# Patient Record
Sex: FEMALE | Race: BLACK OR AFRICAN AMERICAN | NOT HISPANIC OR LATINO | Employment: STUDENT | URBAN - METROPOLITAN AREA
[De-identification: names, ages, dates, MRNs, and addresses within clinical notes are randomized per-mention and may not be internally consistent; named-entity substitution may affect disease eponyms.]

---

## 2018-02-09 ENCOUNTER — NUTRITION (OUTPATIENT)
Dept: NUTRITION | Facility: CLINIC | Age: 38
End: 2018-02-09
Payer: COMMERCIAL

## 2018-02-09 DIAGNOSIS — E66.9 OBESITY, UNSPECIFIED CLASSIFICATION, UNSPECIFIED OBESITY TYPE, UNSPECIFIED WHETHER SERIOUS COMORBIDITY PRESENT: ICD-10-CM

## 2018-02-09 DIAGNOSIS — R73.09 HIGH GLUCOSE: Primary | ICD-10-CM

## 2018-02-09 DIAGNOSIS — E66.9 OBESITY (BMI 30.0-34.9): Primary | ICD-10-CM

## 2018-02-09 PROCEDURE — 97802 MEDICAL NUTRITION INDIV IN: CPT | Mod: S$GLB,,, | Performed by: NUTRITIONIST

## 2018-02-09 NOTE — PROGRESS NOTES
"Nutrition Assessment for Medical Nutrition Therapy    Referring professional: Kenneth    Reason for MNT visit: Pt in for education and nutrition counseling regarding blood sugar control and meal plan to help aid in weight loss    Past Medical History:  Active Ambulatory Problems     Diagnosis Date Noted    No Active Ambulatory Problems     Resolved Ambulatory Problems     Diagnosis Date Noted    No Resolved Ambulatory Problems     No Additional Past Medical History   Client was very difficult to understand (client has a very thick accent and doesn't speak English very well)    Pertinent Labs: n/a    Medications: birth control (she isn't sure brand)    Wt: 209.7   Height: 5'5"  BMI: 34.89    Energy Requirements:  Calories: 1500  Protein: 104-167 grams  Carbohydrates:  grams  Focus on Heart Healthy Fats  Fluid: 102 oz per day + sweat loss    Breakfast: 1 serving of carb = 15-20 grams + at least 10 grams lean protein/heart healthy fat   1-2 slices 100% whole wheat bread + 2 eggs   ¾ cup cereal (see notes for brands) + Fairlife milk   [place]: 2 Slices 100% whole wheat bread + 3 oz turkey/ham + slice of cheese    Snack: 1 serving of carb = 15-20 grams + at least 10 grams lean protein/heart healthy fat   Needed if going >3-4 hours between meals (See below snack for options)    Lunch: 5 oz lean protein + non-starchy veggies + 1 serving of carb = 15-20 grams   Think ½ cup cooked starch (Ex: brown rice) + 5 oz lean protein (Ex: chicken) + side of non-starchy veggies   (Look at the Plate Method on the sheet of paper I gave to you)    Snack: 1 serving of carb = 15-20 grams + at least 10 grams lean protein/heart healthy fat   10 Beanitos + ¼ cup shredded cheese melted on top   10 Beanitos + 1/3 cup guacamole   Piece of fruit + 1 Tbsp PB   1 slice of 100% whole wheat bread + 1 Tbsp PB   Any breakfast can be a snack   No sugar added hot chocolate packet + piece of cheese on the side    Dinner: NO " carbs   Focus on 5 oz lean protein (palm size/thickness) + unlimited non-starchy veggies  o Cauliflower rice, zoodles, kabobs, stir arana, etc.     Optional Snack: Anything UP  calories   1 fun size piece of Halloween candy   1/3 pint Halo Top high protein ice cream   Enlightened ice cream bar   1 packet of no sugar added hot chocolate          Additional Notes:  Incorporate a source of lean protein, fiber, and heart healthy fat with each meal.   - These three nutrients take the longest to digest, so we feel full longer    Restaurant TipsPick 2 out of the 5:  1. Bread and butter/chips and salsa  2. Appetizer  3. Entrée  4. Dessert  5. Alcohol  Eating out in [place]:  1. www.eatBranding Brand  2. [facility] Eat Fit elise (Free elise for ENDOGENXs)  a. List of all Eat Fit restaurants  b. See what dishes are Eat Fit at each restaurant  c. See the nutrition facts for each Eat Fit dish  d. Provides >200 Eat Fit recipes  3. Fast Food Lite Guide  Salad Fats: Pick 2, each being 2 Tbsp:  1. Dressing  2. Cheese  3. Nuts  4. Mathias  5. Avocado (1/4 okay)  6. Guacamole  7. Sour cream  8. Egg (1 okay)    [place] products: Look for products that have <7 grams sugar, and at least 7 grams or more protein  -Choose 100% whole wheat/whole grain products (Essentially, wheat bread and wheat flour mean white bread/flour)  -3 things that cause inflammation to our body:   White/refined carbohydrates   Sugar   Alcohol  -Cheat meals: 2x per month  -Fairlife milk  -Cereals: Look for ones that have <7 grams sugar and at least 7 or more grams protein:   1. Special K PROTEIN   2. Nutritious Livings Hi-Lo   3. Kashi: GO LEAN  -Even 100% fruit juice contains the same amount of sugar as a soft drink.     Nutrition History       Food allergies  intolerances: NKFA    Dining out: Infrequent, 1-2 times per week    Smoking/alcohol: nonsmoker; drinks a glass of wine or rum punch once a month at the most (very rare)    Beverages:   Coffee:  1-2 cups per day with Stevia  Water: 2-3, 32 oz glasses per day  Fruit juice: 8 oz few times a week  Hot chocolate:1 cup every day    Meal preparation/shopping: self, spouse      Recommendations/Interventions:  1. Aim for 7-9 hours sleep  2. Exercise 60 minutes most days  3. Eat breakfast within 1-2 hours of waking up  4. Try not to skip any meals or snacks, not going more than 3-4 hours without eating.   5. At each meal and snack, try to include a source of fiber + lean protein + healthy fat.     Written Materials Provided  These resources are intended to assist the patient in making it easier to choose recommended options when eating out to identify better-for-you brands at the grocery store:     Meal Planning Guide with recommendations discussed along with portion sizes and a customized meal plan.    Eat Fit elise card as a reminder to download the elise to ones smart phone which provides: current Eat Fit partners, approved menu options, food labels for carb counting, & recipes.    Fast Food Lite Guide   brotips Grocery Product list    RD contact information      Comprehension: poor    Motivation to change: moderate      Follow-up: 3 weeks    Counseling time: 1 Hour 30 Minutes

## 2018-03-05 ENCOUNTER — NUTRITION (OUTPATIENT)
Dept: NUTRITION | Facility: CLINIC | Age: 38
End: 2018-03-05
Payer: COMMERCIAL

## 2018-03-05 DIAGNOSIS — Z71.3 NUTRITIONAL COUNSELING: Primary | ICD-10-CM

## 2018-03-05 PROCEDURE — 97803 MED NUTRITION INDIV SUBSEQ: CPT | Mod: S$GLB,,, | Performed by: NUTRITIONIST

## 2018-03-05 NOTE — PROGRESS NOTES
Reason for MNT visit: nutritional counseling to lose body fat.    Her clothes are fitting a little looser, but not a lot. We last met on 2/9/2018. Her weight stayed the same (209 pounds). She has been doing some carbohydrates at night. Snacks: slice of whole wheat bread or apple + Pb. She liked the Special K protein cereal (3/4 cup). She hasn't been drinking hot chocolate in the morning. Breakfast: smoothie: Total lean protein powder + celery, pineapple and spinach.    Last week she did 1 day of exercise. The week before she exercised 5 days per week. The goal was at least 4 days per week for 1 hour. She has been walking on the treadmill. She did talk with a  who recommended doing some weights.     She got rid of the sweets. I provided more snack options to give her a variety.     She doesn't speak English very well, so I recommended her to write down everything to a suzy (portions of all meals and snacks + timing) and to email me a few days worth to see if I can find any hidden calories      Comprehension: poor    Motivation to change: moderate    Follow-up: 3 weeks    Counseling time: 45 Minutes

## 2018-03-26 ENCOUNTER — NUTRITION (OUTPATIENT)
Dept: NUTRITION | Facility: CLINIC | Age: 38
End: 2018-03-26
Payer: COMMERCIAL

## 2018-03-26 DIAGNOSIS — Z71.3 NUTRITIONAL COUNSELING: Primary | ICD-10-CM

## 2018-03-26 PROCEDURE — 97803 MED NUTRITION INDIV SUBSEQ: CPT | Mod: S$GLB,,, | Performed by: NUTRITIONIST

## 2018-03-27 NOTE — PROGRESS NOTES
Reason for MNT visit: overweight    She lost 7 pounds since we last met 2 weeks ago. Her weight is 203#, and she is feeling great. She reviewed her meal plan and stuck with he recommended portions for all nutrients and is doing well with no carbs at night.       Recommendations: Continue following recommended meal plan    Comprehension: good     Motivation to change: moderate    Follow-up: 3 weeks    Counseling time: 45 Minutes

## 2018-04-16 ENCOUNTER — NUTRITION (OUTPATIENT)
Dept: NUTRITION | Facility: CLINIC | Age: 38
End: 2018-04-16
Payer: COMMERCIAL

## 2018-04-16 DIAGNOSIS — Z71.3 NUTRITIONAL COUNSELING: Primary | ICD-10-CM

## 2018-04-16 PROCEDURE — 97803 MED NUTRITION INDIV SUBSEQ: CPT | Mod: S$GLB,,, | Performed by: NUTRITIONIST

## 2018-04-16 NOTE — PROGRESS NOTES
Reason for MNT visit: goal of weight loss    Weight: 201.6 pounds  She is down 1.4 pounds from our last visit 3 weeks.   She is still exercising 5-6 days per week.   She feels constipated, but is drinking plenty of water and eating the appropriate fiber (she has had issues with constipation since she was a child).   She is going to [place] for 1.5 months. The focus on this is weight control, but she can lose ewight by keeping portion control in check + exercise.       Recommendations: Continue following recommended meal plan. Also recommended adding 2 Tbsp psyllium to her morning smoothie.     Comprehension: fair     Motivation to change: moderate    Follow-up: PRN    Counseling time: 45 Minutes

## 2019-07-07 ENCOUNTER — HOSPITAL ENCOUNTER (EMERGENCY)
Facility: HOSPITAL | Age: 39
Discharge: HOME | End: 2019-07-07
Attending: EMERGENCY MEDICINE
Payer: COMMERCIAL

## 2019-07-07 VITALS
WEIGHT: 195 LBS | BODY MASS INDEX: 32.49 KG/M2 | RESPIRATION RATE: 14 BRPM | TEMPERATURE: 98 F | DIASTOLIC BLOOD PRESSURE: 82 MMHG | OXYGEN SATURATION: 99 % | HEIGHT: 65 IN | HEART RATE: 74 BPM | SYSTOLIC BLOOD PRESSURE: 122 MMHG

## 2019-07-07 DIAGNOSIS — R20.2 PARESTHESIAS: ICD-10-CM

## 2019-07-07 DIAGNOSIS — R53.83 FATIGUE, UNSPECIFIED TYPE: Primary | ICD-10-CM

## 2019-07-07 LAB
ANION GAP SERPL CALC-SCNC: 8 MMOL/L (ref 8–16)
B-HCG UR QL: NEGATIVE
BACTERIA #/AREA URNS HPF: NORMAL /HPF
BASOPHILS # BLD AUTO: 0.02 K/UL (ref 0–0.2)
BASOPHILS NFR BLD: 0.2 % (ref 0–1.9)
BILIRUB UR QL STRIP: NEGATIVE
BUN SERPL-MCNC: 12 MG/DL (ref 6–20)
CALCIUM SERPL-MCNC: 9.7 MG/DL (ref 8.7–10.5)
CHLORIDE SERPL-SCNC: 104 MMOL/L (ref 95–110)
CLARITY UR: CLEAR
CO2 SERPL-SCNC: 26 MMOL/L (ref 23–29)
COLOR UR: YELLOW
CREAT SERPL-MCNC: 0.9 MG/DL (ref 0.5–1.4)
CTP QC/QA: YES
DIFFERENTIAL METHOD: ABNORMAL
EOSINOPHIL # BLD AUTO: 0.3 K/UL (ref 0–0.5)
EOSINOPHIL NFR BLD: 3.3 % (ref 0–8)
ERYTHROCYTE [DISTWIDTH] IN BLOOD BY AUTOMATED COUNT: 13.3 % (ref 11.5–14.5)
EST. GFR  (AFRICAN AMERICAN): >60 ML/MIN/1.73 M^2
EST. GFR  (NON AFRICAN AMERICAN): >60 ML/MIN/1.73 M^2
GLUCOSE SERPL-MCNC: 84 MG/DL (ref 70–110)
GLUCOSE UR QL STRIP: NEGATIVE
HCT VFR BLD AUTO: 35.4 % (ref 37–48.5)
HGB BLD-MCNC: 10.9 G/DL (ref 12–16)
HGB UR QL STRIP: ABNORMAL
KETONES UR QL STRIP: NEGATIVE
LEUKOCYTE ESTERASE UR QL STRIP: NEGATIVE
LYMPHOCYTES # BLD AUTO: 2.5 K/UL (ref 1–4.8)
LYMPHOCYTES NFR BLD: 30.7 % (ref 18–48)
MCH RBC QN AUTO: 29.2 PG (ref 27–31)
MCHC RBC AUTO-ENTMCNC: 30.8 G/DL (ref 32–36)
MCV RBC AUTO: 95 FL (ref 82–98)
MICROSCOPIC COMMENT: NORMAL
MONOCYTES # BLD AUTO: 0.5 K/UL (ref 0.3–1)
MONOCYTES NFR BLD: 5.8 % (ref 4–15)
NEUTROPHILS # BLD AUTO: 4.8 K/UL (ref 1.8–7.7)
NEUTROPHILS NFR BLD: 60 % (ref 38–73)
NITRITE UR QL STRIP: NEGATIVE
PH UR STRIP: 8 [PH] (ref 5–8)
PLATELET # BLD AUTO: 391 K/UL (ref 150–350)
PMV BLD AUTO: 9.8 FL (ref 9.2–12.9)
POTASSIUM SERPL-SCNC: 4.1 MMOL/L (ref 3.5–5.1)
PROT UR QL STRIP: NEGATIVE
RBC # BLD AUTO: 3.73 M/UL (ref 4–5.4)
RBC #/AREA URNS HPF: 1 /HPF (ref 0–4)
SODIUM SERPL-SCNC: 138 MMOL/L (ref 136–145)
SP GR UR STRIP: 1.01 (ref 1–1.03)
TSH SERPL DL<=0.005 MIU/L-ACNC: 2.28 UIU/ML (ref 0.4–4)
URN SPEC COLLECT METH UR: ABNORMAL
UROBILINOGEN UR STRIP-ACNC: NEGATIVE EU/DL
WBC # BLD AUTO: 8.1 K/UL (ref 3.9–12.7)
WBC #/AREA URNS HPF: 0 /HPF (ref 0–5)

## 2019-07-07 PROCEDURE — 81000 URINALYSIS NONAUTO W/SCOPE: CPT

## 2019-07-07 PROCEDURE — 85025 COMPLETE CBC W/AUTO DIFF WBC: CPT

## 2019-07-07 PROCEDURE — 84443 ASSAY THYROID STIM HORMONE: CPT

## 2019-07-07 PROCEDURE — 81025 URINE PREGNANCY TEST: CPT | Performed by: NURSE PRACTITIONER

## 2019-07-07 PROCEDURE — 93005 ELECTROCARDIOGRAM TRACING: CPT

## 2019-07-07 PROCEDURE — 80048 BASIC METABOLIC PNL TOTAL CA: CPT

## 2019-07-07 PROCEDURE — 99283 EMERGENCY DEPT VISIT LOW MDM: CPT

## 2019-07-07 NOTE — ED NOTES
Pt having numbness and tingling to left arm. Pt feeling weak when walking. Pt has history of left shoulder pain that she went and saw a chiropractor for and pain resolved. Last week pt and friend walked into each other and pt fell and pt now has discomfort to left arm . Pt awake and alert.

## 2019-07-07 NOTE — DISCHARGE INSTRUCTIONS
Increase your water intake. Rest. Return to the ED if your condition changes, progresses, or if you have any concerns.

## 2019-07-07 NOTE — ED TRIAGE NOTES
Patient stated that she has been feeling weak, thirsty, and fatigued for the past two weeks, Patient also stated that she had tingling and numbness to left arm. Patient is AAOx4, skin is warm and dry, respirations even and non labored, patient denies chest pain or SOB. Patient placed into a gown.

## 2019-07-07 NOTE — ED PROVIDER NOTES
"Encounter Date: 7/7/2019       History     Chief Complaint   Patient presents with    Fatigue     x 2 weeks, complains of joint pain, and left arm " feeling cold, and pins and needles feeling while at [employer] today"pt also compains of right hand feeling like pins and needles, pt has full ROM noted, denies HA or blurry vision      37yo female here for fatigue and pins and needles sensation to BUE and BLE intermittently for the past few days.  Nothing makes it better or worse.  She reports "joint pain" and cramps throughout her body.  No trauma, fever/chills, HA, CP, SOB, cough, abdominal pain, weakness, neck pain, n/v/d, rash, swelling, or wound.  She denies new medications.  She states that she has been very thirsty over the past few days.  She has tried nothing for the symptoms. She came to the ED today because she has heard from friends that if her left arm is hurting she should go to the ED.  The patient was ambulatory into the ED.  This is the extent of her complaints at this time.    The history is provided by the patient.     Review of patient's allergies indicates:  No Known Allergies  History reviewed. No pertinent past medical history.  No past surgical history on file.  History reviewed. No pertinent family history.  Social History     Tobacco Use    Smoking status: Not on file   Substance Use Topics    Alcohol use: Not on file    Drug use: Not on file     Review of Systems   Constitutional: Negative for activity change, appetite change, fatigue and fever.   HENT: Negative for congestion.    Respiratory: Negative for cough and shortness of breath.    Cardiovascular: Negative for chest pain.   Gastrointestinal: Negative for abdominal pain, diarrhea and vomiting.   Endocrine: Positive for polydipsia. Negative for polyphagia and polyuria.   Genitourinary: Negative for dysuria.   Musculoskeletal: Negative for arthralgias, back pain and neck pain.   Skin: Negative for color change and wound. "   Allergic/Immunologic: Negative for immunocompromised state.   Neurological: Negative for dizziness, weakness, light-headedness, numbness and headaches.        Tingling to hands and feet   Hematological: Does not bruise/bleed easily.   Psychiatric/Behavioral: Negative for confusion.       Physical Exam     Initial Vitals [07/07/19 1238]   BP Pulse Resp Temp SpO2   128/74 73 18 98.5 °F (36.9 °C) 100 %      MAP       --         Physical Exam    Nursing note and vitals reviewed.  Constitutional: Vital signs are normal. She appears well-developed and well-nourished. She is active and cooperative. She is easily aroused.  Non-toxic appearance. She does not have a sickly appearance. She does not appear ill. No distress.   HENT:   Head: Normocephalic and atraumatic.   Eyes: Conjunctivae and EOM are normal.   Neck: Trachea normal, normal range of motion, full passive range of motion without pain and phonation normal. Neck supple. No thyroid mass and no thyromegaly present.   Cardiovascular: Normal rate, regular rhythm and normal heart sounds.   Pulses:       Radial pulses are 2+ on the right side, and 2+ on the left side.        Dorsalis pedis pulses are 2+ on the right side, and 2+ on the left side.   Pulmonary/Chest: Effort normal and breath sounds normal.   Abdominal: Soft. Normal appearance and bowel sounds are normal.   Musculoskeletal:        Cervical back: Normal.        Left upper arm: Normal.        Left forearm: Normal.        Left hand: Normal.        Right lower leg: Normal.        Left lower leg: Normal.        Right foot: Normal.        Left foot: Normal.   Sensation and strength intact BLE and BUE.  No swelling or edema. No signs of trauma.  No bony tenderness.  Full active range of motion of bilateral upper and lower extremities.   Neurological: She is alert, oriented to person, place, and time and easily aroused. She has normal strength. No cranial nerve deficit or sensory deficit. Coordination and gait  normal. GCS eye subscore is 4. GCS verbal subscore is 5. GCS motor subscore is 6.   Skin: Skin is warm, dry and intact. Capillary refill takes less than 2 seconds. No abrasion, no bruising and no rash noted. No cyanosis or erythema. No pallor.   Psychiatric: She has a normal mood and affect. Her speech is normal and behavior is normal. Judgment and thought content normal. Cognition and memory are normal.         ED Course   Procedures  Labs Reviewed   CBC W/ AUTO DIFFERENTIAL - Abnormal; Notable for the following components:       Result Value    RBC 3.73 (*)     Hemoglobin 10.9 (*)     Hematocrit 35.4 (*)     Mean Corpuscular Hemoglobin Conc 30.8 (*)     Platelets 391 (*)     All other components within normal limits   URINALYSIS - Abnormal; Notable for the following components:    Occult Blood UA 1+ (*)     All other components within normal limits   BASIC METABOLIC PANEL   TSH   URINALYSIS MICROSCOPIC   POCT URINE PREGNANCY     EKG Readings: (Independently Interpreted)   Initial Reading: No STEMI. Rhythm: Normal Sinus Rhythm. Heart Rate: 67. Ectopy: No Ectopy. Conduction: Normal. ST Segments: Normal ST Segments. T Waves: Normal. Clinical Impression: Normal Sinus Rhythm       Imaging Results    None          Medical Decision Making:   Initial Assessment:   38-year-old female here for paresthesias and body aches.  No focal neuro findings.  Sensation and strength intact bilateral upper and lower extremities. No rash, wound, or signs of trauma.  Differential Diagnosis:   Fatigue, anxiety, Electrolyte derangement, thyroid problem, arrhythmia  Clinical Tests:   Lab Tests: Ordered and Reviewed  Medical Tests: Ordered and Reviewed  ED Management:  Labs, EKG  Labs unremarkable. No indication for imaging at this time.  I do not suspect ICH or CVA.  Encouraged rest and increased fluid intake.  Return to the ED if her condition changes, progresses, or if there are any concerns.  She is to follow up with her PCP within 3-5  days.  The patient verbalized understanding, compliance, and agreement with the treatment plan.                      Clinical Impression:       ICD-10-CM ICD-9-CM   1. Fatigue, unspecified type R53.83 780.79   2. Paresthesias R20.2 782.0                                Adriana Glass, [facility]  07/07/19 1915

## 2019-07-08 DIAGNOSIS — R53.83 FATIGUE, UNSPECIFIED TYPE: Primary | ICD-10-CM

## 2020-04-23 DIAGNOSIS — I87.2 VENOUS INSUFFICIENCY: Primary | ICD-10-CM

## 2020-04-27 ENCOUNTER — TELEPHONE (OUTPATIENT)
Dept: VASCULAR SURGERY | Facility: CLINIC | Age: 40
End: 2020-04-27

## 2020-04-27 NOTE — TELEPHONE ENCOUNTER
"Spoke with Ms Wheeler, informed her per my supervisor, " we are not scheduling any vein patients until further notice." Ms Wheeler verbalized understanding.  "

## 2020-05-12 ENCOUNTER — TELEPHONE (OUTPATIENT)
Dept: VASCULAR SURGERY | Facility: CLINIC | Age: 40
End: 2020-05-12

## 2020-05-12 NOTE — TELEPHONE ENCOUNTER
Spoke with pt and informed her that she needs a referral from her PCP for varicose veins.Also informed that she will need an u/s prior to appt and that will determine if she needs to see Dr Shultz.Due to Covid-19 appt to see Dr Shultz may not be until Aug/Sep and she verbalized understanding.  ----- Message from Georgia Hill sent at 5/12/2020  1:57 PM CDT -----  Contact: pt called [phone]  Stated that she received message to call in and get scheduled.

## 2020-05-29 ENCOUNTER — LAB VISIT (OUTPATIENT)
Dept: PRIMARY CARE CLINIC | Facility: CLINIC | Age: 40
End: 2020-05-29
Payer: COMMERCIAL

## 2020-05-29 DIAGNOSIS — Z11.59 ENCOUNTER FOR SCREENING FOR OTHER VIRAL DISEASES: Primary | ICD-10-CM

## 2020-05-29 PROCEDURE — U0003 INFECTIOUS AGENT DETECTION BY NUCLEIC ACID (DNA OR RNA); SEVERE ACUTE RESPIRATORY SYNDROME CORONAVIRUS 2 (SARS-COV-2) (CORONAVIRUS DISEASE [COVID-19]), AMPLIFIED PROBE TECHNIQUE, MAKING USE OF HIGH THROUGHPUT TECHNOLOGIES AS DESCRIBED BY CMS-2020-01-R: HCPCS

## 2020-05-31 LAB — SARS-COV-2 RNA RESP QL NAA+PROBE: NOT DETECTED

## 2020-06-09 ENCOUNTER — HOSPITAL ENCOUNTER (EMERGENCY)
Facility: HOSPITAL | Age: 40
Discharge: HOME | End: 2020-06-09
Attending: EMERGENCY MEDICINE
Payer: COMMERCIAL

## 2020-06-09 VITALS
SYSTOLIC BLOOD PRESSURE: 93 MMHG | BODY MASS INDEX: 28.99 KG/M2 | TEMPERATURE: 98 F | OXYGEN SATURATION: 100 % | RESPIRATION RATE: 18 BRPM | HEIGHT: 65 IN | DIASTOLIC BLOOD PRESSURE: 66 MMHG | HEART RATE: 68 BPM | WEIGHT: 174 LBS

## 2020-06-09 DIAGNOSIS — R23.3 BRUISES EASILY: Primary | ICD-10-CM

## 2020-06-09 DIAGNOSIS — M79.661 RIGHT CALF PAIN: ICD-10-CM

## 2020-06-09 DIAGNOSIS — R10.13 EPIGASTRIC PAIN: ICD-10-CM

## 2020-06-09 LAB
ALBUMIN SERPL BCP-MCNC: 4 G/DL (ref 3.5–5.2)
ALP SERPL-CCNC: 80 U/L (ref 55–135)
ALT SERPL W/O P-5'-P-CCNC: 31 U/L (ref 10–44)
ANION GAP SERPL CALC-SCNC: 6 MMOL/L (ref 8–16)
APTT BLDCRRT: 25.5 SEC (ref 21–32)
AST SERPL-CCNC: 24 U/L (ref 10–40)
B-HCG UR QL: NEGATIVE
BASOPHILS # BLD AUTO: 0.05 K/UL (ref 0–0.2)
BASOPHILS NFR BLD: 0.6 % (ref 0–1.9)
BILIRUB SERPL-MCNC: 0.6 MG/DL (ref 0.1–1)
BUN SERPL-MCNC: 14 MG/DL (ref 6–20)
CALCIUM SERPL-MCNC: 9.5 MG/DL (ref 8.7–10.5)
CHLORIDE SERPL-SCNC: 106 MMOL/L (ref 95–110)
CO2 SERPL-SCNC: 28 MMOL/L (ref 23–29)
CREAT SERPL-MCNC: 0.9 MG/DL (ref 0.5–1.4)
CTP QC/QA: YES
DIFFERENTIAL METHOD: ABNORMAL
EOSINOPHIL # BLD AUTO: 0.3 K/UL (ref 0–0.5)
EOSINOPHIL NFR BLD: 3.9 % (ref 0–8)
ERYTHROCYTE [DISTWIDTH] IN BLOOD BY AUTOMATED COUNT: 13.6 % (ref 11.5–14.5)
EST. GFR  (AFRICAN AMERICAN): >60 ML/MIN/1.73 M^2
EST. GFR  (NON AFRICAN AMERICAN): >60 ML/MIN/1.73 M^2
GLUCOSE SERPL-MCNC: 69 MG/DL (ref 70–110)
HCT VFR BLD AUTO: 37 % (ref 37–48.5)
HGB BLD-MCNC: 11.4 G/DL (ref 12–16)
IMM GRANULOCYTES # BLD AUTO: 0.03 K/UL (ref 0–0.04)
IMM GRANULOCYTES NFR BLD AUTO: 0.4 % (ref 0–0.5)
INR PPP: 1 (ref 0.8–1.2)
LIPASE SERPL-CCNC: 43 U/L (ref 4–60)
LYMPHOCYTES # BLD AUTO: 2.3 K/UL (ref 1–4.8)
LYMPHOCYTES NFR BLD: 29.2 % (ref 18–48)
MCH RBC QN AUTO: 30.1 PG (ref 27–31)
MCHC RBC AUTO-ENTMCNC: 30.8 G/DL (ref 32–36)
MCV RBC AUTO: 98 FL (ref 82–98)
MONOCYTES # BLD AUTO: 0.5 K/UL (ref 0.3–1)
MONOCYTES NFR BLD: 6.8 % (ref 4–15)
NEUTROPHILS # BLD AUTO: 4.7 K/UL (ref 1.8–7.7)
NEUTROPHILS NFR BLD: 59.1 % (ref 38–73)
NRBC BLD-RTO: 0 /100 WBC
PLATELET # BLD AUTO: 370 K/UL (ref 150–350)
PMV BLD AUTO: 10.3 FL (ref 9.2–12.9)
POTASSIUM SERPL-SCNC: 4.3 MMOL/L (ref 3.5–5.1)
PROT SERPL-MCNC: 7.7 G/DL (ref 6–8.4)
PROTHROMBIN TIME: 10.4 SEC (ref 9–12.5)
RBC # BLD AUTO: 3.79 M/UL (ref 4–5.4)
SALICYLATES SERPL-MCNC: <5 MG/DL (ref 15–30)
SODIUM SERPL-SCNC: 140 MMOL/L (ref 136–145)
WBC # BLD AUTO: 7.94 K/UL (ref 3.9–12.7)

## 2020-06-09 PROCEDURE — 80053 COMPREHEN METABOLIC PANEL: CPT

## 2020-06-09 PROCEDURE — 85730 THROMBOPLASTIN TIME PARTIAL: CPT

## 2020-06-09 PROCEDURE — 83690 ASSAY OF LIPASE: CPT

## 2020-06-09 PROCEDURE — 99284 PR EMERGENCY DEPT VISIT,LEVEL IV: ICD-10-PCS | Mod: ,,, | Performed by: PHYSICIAN ASSISTANT

## 2020-06-09 PROCEDURE — 99284 EMERGENCY DEPT VISIT MOD MDM: CPT | Mod: 25

## 2020-06-09 PROCEDURE — 99284 EMERGENCY DEPT VISIT MOD MDM: CPT | Mod: ,,, | Performed by: PHYSICIAN ASSISTANT

## 2020-06-09 PROCEDURE — 85610 PROTHROMBIN TIME: CPT

## 2020-06-09 PROCEDURE — 81025 URINE PREGNANCY TEST: CPT | Performed by: PHYSICIAN ASSISTANT

## 2020-06-09 PROCEDURE — 80307 DRUG TEST PRSMV CHEM ANLYZR: CPT

## 2020-06-09 PROCEDURE — 85025 COMPLETE CBC W/AUTO DIFF WBC: CPT

## 2020-06-09 RX ORDER — ASPIRIN 325 MG
325 TABLET ORAL DAILY
COMMUNITY

## 2020-06-09 RX ORDER — CHLORZOXAZONE 500 MG/1
500 TABLET ORAL 4 TIMES DAILY PRN
COMMUNITY

## 2020-06-09 RX ORDER — FAMOTIDINE 20 MG/1
20 TABLET, FILM COATED ORAL 2 TIMES DAILY PRN
Qty: 20 TABLET | Refills: 0 | Status: SHIPPED | OUTPATIENT
Start: 2020-06-09 | End: 2021-06-09

## 2020-06-09 NOTE — ED TRIAGE NOTES
"Patient states she has a "blood circulation" problem, states small bruises "all over" x 1 month- 1 year PTA, currently with intermittent  small dark bruise noted to JOSH LAW, left flank, bruise on Left back calf that has "disappeared" States unable to see cardiovascular physician, was told to take Aspirin daily. Has chronic gastritis with pain to upper abdomen x 1 week. States "pinching" pain. Denies fevers. States this morning she was "screaming" with cramps to RLE.   "

## 2020-06-09 NOTE — ED NOTES
Patient identifiers verified and correct for Ms Tate  C/C: Bruising, abd pain SEE NN  APPEARANCE: awake and alert in NAD.  SKIN: warm, dry small dark areas to ANI, CARYN, left flank   MUSCULOSKELETAL: Patient moving all extremities spontaneously, no obvious swelling or deformities noted. Ambulates independently.  RESPIRATORY: Denies shortness of breath.Respirations unlabored.   CARDIAC: Denies CP, 2+ distal pulses; no peripheral edema  ABDOMEN: S/ND/NT, Denies nausea  : voids spontaneously, denies difficulty  Neurologic: AAO x 4; follows commands equal strength in all extremities; denies numbness/tingling. Denies dizziness Denies weakness, spasms to RLE lasting 2 min PTA

## 2020-06-09 NOTE — ED PROVIDER NOTES
Encounter Date: 6/9/2020       History     Chief Complaint   Patient presents with    Abdominal Pain     patient has had bruising and was taking ASA, Cramping in legs x 1 week     The patient speaks [place] Islander primarily, but also speaks and understands English. She states that she has a history of gastritis and GERD. She was previously taking an antacid, but does not recall the name of the medication and is no longer taking it. She states that she had a small varicose vein to her right calf area for more than a year. Last month she noticed that it became larger and bruised. She states that she went to her PCP for this and was told to take Aspirin. She states that she was referred to see a vascular physician, but was unable to be seen due to the Covid pandemic. She has been taking 325 mg Aspirin tablets daily. She states that the varicose vein and bruising went away, but she has been noticing small random bruises come and go to her extremities since. She indicates that she currently has 3-4 small random new atraumatic bruises. She states that she is worried that she was referred to see a vascular doctor for a circulation or clotting problem. She states that she has been having increased episodes of epigastric pain and feels like her gastritis is getting worse. She denies any black or bloody stools/emesis. She states that this morning she woke up due to a sudden pain in her right calf like a porter horse, that was painful and intense, but only lasted a few minutes, then resolved. She is worried that she has a blood clot in her leg, prompting her ER visit today. She denies any history of DVT/PE in the past. She denies any CP or SOB. Her  is worried that she is taking too much aspirin.         Review of patient's allergies indicates:  No Known Allergies  Past Medical History:   Diagnosis Date    Uses [place] Islander as primary spoken language     Varicose vein of leg      Past Surgical History:   Procedure Laterality  Date    BREAST SURGERY       History reviewed. No pertinent family history.  Social History     Tobacco Use    Smoking status: Never Smoker    Smokeless tobacco: Never Used   Substance Use Topics    Alcohol use: Never     Frequency: Never    Drug use: Never     Review of Systems   Constitutional: Negative for chills, diaphoresis and fever.   HENT: Negative for nosebleeds.    Eyes: Negative for visual disturbance.   Respiratory: Negative for cough, chest tightness and shortness of breath.    Cardiovascular: Negative for chest pain, palpitations and leg swelling.   Gastrointestinal: Negative for abdominal pain, anal bleeding, blood in stool, constipation, diarrhea, nausea and vomiting.   Genitourinary: Negative for difficulty urinating, hematuria, menstrual problem and vaginal bleeding.   Musculoskeletal: Positive for myalgias. Negative for arthralgias, back pain and gait problem.   Skin: Negative for color change, rash and wound.   Allergic/Immunologic: Negative for immunocompromised state.   Neurological: Negative for dizziness, syncope, speech difficulty, weakness, light-headedness, numbness and headaches.   Hematological: Bruises/bleeds easily.   Psychiatric/Behavioral: Negative for confusion. The patient is nervous/anxious.        Physical Exam     Initial Vitals [06/09/20 1219]   BP Pulse Resp Temp SpO2   110/77 81 20 98.7 °F (37.1 °C) 100 %      MAP       --         Physical Exam    Nursing note and vitals reviewed.  Constitutional: She appears well-developed and well-nourished. She is not diaphoretic. No distress.   HENT:   Head: Normocephalic.   Mouth/Throat: Oropharynx is clear and moist.   Eyes: Conjunctivae are normal. Pupils are equal, round, and reactive to light. No scleral icterus.   Neck: Neck supple. No JVD present.   Cardiovascular: Normal rate, regular rhythm and intact distal pulses.   Normal posterior tibial and dorsalis pedis pulses bilaterally.    Pulmonary/Chest: Breath sounds normal. No  respiratory distress.   Abdominal: Soft. There is no tenderness. There is no rebound and no guarding.   Benign abdomen.    Musculoskeletal: Normal range of motion. She exhibits no edema or tenderness.   No calf tenderness or swelling currently. Negative Ye's sign.    Neurological: She is alert and oriented to person, place, and time. She has normal strength. No sensory deficit.   Skin: Skin is warm and dry. Capillary refill takes less than 2 seconds. No rash noted. No erythema.   Small non-tender, non-thrombosed varicose vein to lower leg. There are 3 small very minor bruises - each smaller than a quarter in size, random locations - not tender or swollen. No petechial rash. No purpura.    Psychiatric:   Anxious.          ED Course   Procedures  Labs Reviewed   CBC W/ AUTO DIFFERENTIAL - Abnormal; Notable for the following components:       Result Value    RBC 3.79 (*)     Hemoglobin 11.4 (*)     Mean Corpuscular Hemoglobin Conc 30.8 (*)     Platelets 370 (*)     All other components within normal limits   COMPREHENSIVE METABOLIC PANEL - Abnormal; Notable for the following components:    Glucose 69 (*)     Anion Gap 6 (*)     All other components within normal limits   SALICYLATE LEVEL - Abnormal; Notable for the following components:    Salicylate Lvl <5.0 (*)     All other components within normal limits   PROTIME-INR   APTT   LIPASE   POCT URINE PREGNANCY     Results for orders placed or performed during the hospital encounter of 06/09/20   CBC auto differential   Result Value Ref Range    WBC 7.94 3.90 - 12.70 K/uL    RBC 3.79 (L) 4.00 - 5.40 M/uL    Hemoglobin 11.4 (L) 12.0 - 16.0 g/dL    Hematocrit 37.0 37.0 - 48.5 %    Mean Corpuscular Volume 98 82 - 98 fL    Mean Corpuscular Hemoglobin 30.1 27.0 - 31.0 pg    Mean Corpuscular Hemoglobin Conc 30.8 (L) 32.0 - 36.0 g/dL    RDW 13.6 11.5 - 14.5 %    Platelets 370 (H) 150 - 350 K/uL    MPV 10.3 9.2 - 12.9 fL    Immature Granulocytes 0.4 0.0 - 0.5 %    Gran #  (ANC) 4.7 1.8 - 7.7 K/uL    Immature Grans (Abs) 0.03 0.00 - 0.04 K/uL    Lymph # 2.3 1.0 - 4.8 K/uL    Mono # 0.5 0.3 - 1.0 K/uL    Eos # 0.3 0.0 - 0.5 K/uL    Baso # 0.05 0.00 - 0.20 K/uL    nRBC 0 0 /100 WBC    Gran% 59.1 38.0 - 73.0 %    Lymph% 29.2 18.0 - 48.0 %    Mono% 6.8 4.0 - 15.0 %    Eosinophil% 3.9 0.0 - 8.0 %    Basophil% 0.6 0.0 - 1.9 %    Differential Method Automated    Comprehensive metabolic panel   Result Value Ref Range    Sodium 140 136 - 145 mmol/L    Potassium 4.3 3.5 - 5.1 mmol/L    Chloride 106 95 - 110 mmol/L    CO2 28 23 - 29 mmol/L    Glucose 69 (L) 70 - 110 mg/dL    BUN, Bld 14 6 - 20 mg/dL    Creatinine 0.9 0.5 - 1.4 mg/dL    Calcium 9.5 8.7 - 10.5 mg/dL    Total Protein 7.7 6.0 - 8.4 g/dL    Albumin 4.0 3.5 - 5.2 g/dL    Total Bilirubin 0.6 0.1 - 1.0 mg/dL    Alkaline Phosphatase 80 55 - 135 U/L    AST 24 10 - 40 U/L    ALT 31 10 - 44 U/L    Anion Gap 6 (L) 8 - 16 mmol/L    eGFR if African American >60.0 >60 mL/min/1.73 m^2    eGFR if non African American >60.0 >60 mL/min/1.73 m^2   Protime-INR   Result Value Ref Range    Prothrombin Time 10.4 9.0 - 12.5 sec    INR 1.0 0.8 - 1.2   APTT   Result Value Ref Range    aPTT 25.5 21.0 - 32.0 sec   Salicylate level   Result Value Ref Range    Salicylate Lvl <5.0 (L) 15.0 - 30.0 mg/dL   Lipase   Result Value Ref Range    Lipase 43 4 - 60 U/L   POCT urine pregnancy   Result Value Ref Range    POC Preg Test, Ur Negative Negative     Acceptable Yes             Imaging Results          US Lower Extremity Veins Right (Final result)  Result time 06/09/20 14:24:46    Final result by aDr Kim MD (06/09/20 14:24:46)                 Impression:      No evidence of deep venous thrombosis in the right lower extremity.      Electronically signed by: Dar Kim MD  Date:    06/09/2020  Time:    14:24             Narrative:    EXAMINATION:  US LOWER EXTREMITY VEINS RIGHT    CLINICAL HISTORY:  Pain in right lower  "leg    TECHNIQUE:  Duplex and color flow Doppler evaluation and graded compression of the right lower extremity veins was performed.    COMPARISON:  None    FINDINGS:  Duplex and color flow Doppler evaluation does not reveal any evidence of acute venous thrombosis in the common femoral, superficial femoral, greater saphenous, popliteal, peroneal, anterior tibial and posterior tibial veins of the right lower extremity.  There is no reflux to suggest valvular incompetence.                                 Medical Decision Making:   Initial Assessment:   Timeline of symptoms began about a month ago with a varicose vein to lower leg that resolved after staring daily aspirin. Apparently she was not clear on reason for vascular referral and assumed she had a "circulation problem". Since starting daily aspirin, she has had mild intermittent episodes of epigastric pain and random small bruises. This morning she described having a porter horse to her calf and became fearful of a "blood clot" prompting her ER visit. Currently without any epigastric pain. Never experienced any CP or SOB. No calf pain presently.   Differential Diagnosis:   Varicose vein, Thrombophlebitis, NSAID gastritis, Thrombocytopenia, Aspirin poisoning, DVT, muscle cramp, Hypokalemia, PE, Gastric ulcer, Anxiety, Coagulopathy, Claudication, PE, Etc    Clinical Tests:   Lab Tests: Ordered and Reviewed  Radiological Study: Ordered and Reviewed  ED Management:  I discussed the case in detail with the ER attending physician   Venous US negative for DVT   Labs unremarkable   Pt advised to DC taking daily 325 mg Aspirin   Pt advised to take daily PPI   Pt advised to follow up closely with her PCP in the next 1-2 days for re-evaluation and further management   Pt advised to follow up with GI for re-evaluation and further management of intermittent epigastric pain   Pt advised to follow up with vascular as scheduled for varicose vein evaluation and treatment   Pt " reassured   Pt reports feeling better after evaluation and treatment in the ER   Pt advised to return to the ER promptly if unimproved or if worse in any way    Additional MDM:   PERC Rule:   Age is greater than or equal to 50 = 0.0  Heart Rate is greater than or equal to 100 = 0.0  SaO2 on room air < 95% = 0.0  Unilateral leg swelling = 0.0  Hemoptysis = 0.0  Recent surgery or trauma = 0.0  Prior PE or DVT =  0.0  Hormone use = 0.00  PERC Score = 0    Well's Criteria Score:  -Clinical symptoms of DVT (leg swelling, pain with palpation) = 3.0  -Other diagnosis less likely than pulmonary embolism =            0.0  -Heart Rate >100 =   0.0  -Immobilization (= or > than 3 days) or surgery in the previous 4 weeks = 0.0  -Previous DVT/PE = 0.0  -Hemoptysis =          0.0  -Malignancy =           0.0  Well's Probability Score =    3                                    Clinical Impression:       ICD-10-CM ICD-9-CM   1. Bruises easily R23.8 782.9   2. Right calf pain M79.661 729.5   3. Epigastric pain R10.13 789.06         Disposition:   Disposition: Discharged  Condition: Stable     ED Disposition Condition    Discharge Stable        ED Prescriptions     Medication Sig Dispense Start Date End Date Auth. Provider    famotidine (PEPCID) 20 MG tablet Take 1 tablet (20 mg total) by mouth 2 (two) times daily as needed. 20 tablet 6/9/2020 6/9/2021 Jea Henley PA-C        Follow-up Information     Follow up With Specialties Details Why Contact Info    Allan Chadwick MD Family Medicine Schedule an appointment as soon as possible for a visit in 2 days  [address]  [facility] [id number] [phone]      [facility] Emergency Medicine  If symptoms worsen in any way. [address]  [facility] [address] [phone]                                     Jae Henley PA-C  06/10/20 0898

## 2020-10-20 ENCOUNTER — OFFICE VISIT (OUTPATIENT)
Dept: RHEUMATOLOGY | Facility: CLINIC | Age: 40
End: 2020-10-20
Payer: COMMERCIAL

## 2020-10-20 ENCOUNTER — LAB VISIT (OUTPATIENT)
Dept: LAB | Facility: HOSPITAL | Age: 40
End: 2020-10-20
Attending: INTERNAL MEDICINE
Payer: COMMERCIAL

## 2020-10-20 VITALS
WEIGHT: 190.69 LBS | DIASTOLIC BLOOD PRESSURE: 66 MMHG | BODY MASS INDEX: 31.77 KG/M2 | HEART RATE: 69 BPM | HEIGHT: 65 IN | SYSTOLIC BLOOD PRESSURE: 97 MMHG

## 2020-10-20 DIAGNOSIS — H57.89 REDNESS OF RIGHT EYE: ICD-10-CM

## 2020-10-20 DIAGNOSIS — M25.50 POLYARTHRALGIA: ICD-10-CM

## 2020-10-20 DIAGNOSIS — R63.2 BINGE EATING: ICD-10-CM

## 2020-10-20 DIAGNOSIS — H15.091 OTHER SCLERITIS, RIGHT EYE: Primary | ICD-10-CM

## 2020-10-20 DIAGNOSIS — M25.50 POLYARTHRALGIA: Primary | ICD-10-CM

## 2020-10-20 LAB
ALBUMIN SERPL BCP-MCNC: 4.4 G/DL (ref 3.5–5.2)
ALP SERPL-CCNC: 92 U/L (ref 55–135)
ALT SERPL W/O P-5'-P-CCNC: 28 U/L (ref 10–44)
ANION GAP SERPL CALC-SCNC: 8 MMOL/L (ref 8–16)
AST SERPL-CCNC: 24 U/L (ref 10–40)
BASOPHILS # BLD AUTO: 0.03 K/UL (ref 0–0.2)
BASOPHILS NFR BLD: 0.4 % (ref 0–1.9)
BILIRUB SERPL-MCNC: 0.5 MG/DL (ref 0.1–1)
BUN SERPL-MCNC: 11 MG/DL (ref 6–20)
C3 SERPL-MCNC: 127 MG/DL (ref 50–180)
C4 SERPL-MCNC: 37 MG/DL (ref 11–44)
CALCIUM SERPL-MCNC: 9.8 MG/DL (ref 8.7–10.5)
CCP AB SER IA-ACNC: <0.5 U/ML
CHLORIDE SERPL-SCNC: 104 MMOL/L (ref 95–110)
CO2 SERPL-SCNC: 27 MMOL/L (ref 23–29)
CREAT SERPL-MCNC: 0.8 MG/DL (ref 0.5–1.4)
CRP SERPL-MCNC: 0.6 MG/L (ref 0–8.2)
DIFFERENTIAL METHOD: ABNORMAL
EOSINOPHIL # BLD AUTO: 0.2 K/UL (ref 0–0.5)
EOSINOPHIL NFR BLD: 2.5 % (ref 0–8)
ERYTHROCYTE [DISTWIDTH] IN BLOOD BY AUTOMATED COUNT: 13.2 % (ref 11.5–14.5)
ERYTHROCYTE [SEDIMENTATION RATE] IN BLOOD BY WESTERGREN METHOD: 10 MM/HR (ref 0–36)
EST. GFR  (AFRICAN AMERICAN): >60 ML/MIN/1.73 M^2
EST. GFR  (NON AFRICAN AMERICAN): >60 ML/MIN/1.73 M^2
GLUCOSE SERPL-MCNC: 68 MG/DL (ref 70–110)
HCT VFR BLD AUTO: 37.2 % (ref 37–48.5)
HGB BLD-MCNC: 11.3 G/DL (ref 12–16)
IMM GRANULOCYTES # BLD AUTO: 0.01 K/UL (ref 0–0.04)
IMM GRANULOCYTES NFR BLD AUTO: 0.1 % (ref 0–0.5)
LYMPHOCYTES # BLD AUTO: 2.1 K/UL (ref 1–4.8)
LYMPHOCYTES NFR BLD: 30.6 % (ref 18–48)
MCH RBC QN AUTO: 29.4 PG (ref 27–31)
MCHC RBC AUTO-ENTMCNC: 30.4 G/DL (ref 32–36)
MCV RBC AUTO: 97 FL (ref 82–98)
MONOCYTES # BLD AUTO: 0.4 K/UL (ref 0.3–1)
MONOCYTES NFR BLD: 6 % (ref 4–15)
NEUTROPHILS # BLD AUTO: 4 K/UL (ref 1.8–7.7)
NEUTROPHILS NFR BLD: 60.4 % (ref 38–73)
NRBC BLD-RTO: 0 /100 WBC
PLATELET # BLD AUTO: 429 K/UL (ref 150–350)
PMV BLD AUTO: 10.1 FL (ref 9.2–12.9)
POTASSIUM SERPL-SCNC: 3.9 MMOL/L (ref 3.5–5.1)
PROT SERPL-MCNC: 8.3 G/DL (ref 6–8.4)
RBC # BLD AUTO: 3.84 M/UL (ref 4–5.4)
RHEUMATOID FACT SERPL-ACNC: <10 IU/ML (ref 0–15)
SODIUM SERPL-SCNC: 139 MMOL/L (ref 136–145)
WBC # BLD AUTO: 6.7 K/UL (ref 3.9–12.7)

## 2020-10-20 PROCEDURE — 36415 COLL VENOUS BLD VENIPUNCTURE: CPT

## 2020-10-20 PROCEDURE — 86160 COMPLEMENT ANTIGEN: CPT | Mod: 59

## 2020-10-20 PROCEDURE — 86706 HEP B SURFACE ANTIBODY: CPT

## 2020-10-20 PROCEDURE — 80053 COMPREHEN METABOLIC PANEL: CPT

## 2020-10-20 PROCEDURE — 3008F BODY MASS INDEX DOCD: CPT | Mod: CPTII,S$GLB,, | Performed by: INTERNAL MEDICINE

## 2020-10-20 PROCEDURE — 3008F PR BODY MASS INDEX (BMI) DOCUMENTED: ICD-10-PCS | Mod: CPTII,S$GLB,, | Performed by: INTERNAL MEDICINE

## 2020-10-20 PROCEDURE — 86431 RHEUMATOID FACTOR QUANT: CPT

## 2020-10-20 PROCEDURE — 99999 PR PBB SHADOW E&M-EST. PATIENT-LVL III: CPT | Mod: PBBFAC,,, | Performed by: INTERNAL MEDICINE

## 2020-10-20 PROCEDURE — 87340 HEPATITIS B SURFACE AG IA: CPT

## 2020-10-20 PROCEDURE — 86140 C-REACTIVE PROTEIN: CPT

## 2020-10-20 PROCEDURE — 82164 ANGIOTENSIN I ENZYME TEST: CPT

## 2020-10-20 PROCEDURE — 85613 RUSSELL VIPER VENOM DILUTED: CPT

## 2020-10-20 PROCEDURE — 99205 PR OFFICE/OUTPT VISIT, NEW, LEVL V, 60-74 MIN: ICD-10-PCS | Mod: S$GLB,,, | Performed by: INTERNAL MEDICINE

## 2020-10-20 PROCEDURE — 86160 COMPLEMENT ANTIGEN: CPT

## 2020-10-20 PROCEDURE — 86803 HEPATITIS C AB TEST: CPT

## 2020-10-20 PROCEDURE — 86146 BETA-2 GLYCOPROTEIN ANTIBODY: CPT | Mod: 59

## 2020-10-20 PROCEDURE — 86038 ANTINUCLEAR ANTIBODIES: CPT

## 2020-10-20 PROCEDURE — 86200 CCP ANTIBODY: CPT

## 2020-10-20 PROCEDURE — 99999 PR PBB SHADOW E&M-EST. PATIENT-LVL III: ICD-10-PCS | Mod: PBBFAC,,, | Performed by: INTERNAL MEDICINE

## 2020-10-20 PROCEDURE — 86704 HEP B CORE ANTIBODY TOTAL: CPT

## 2020-10-20 PROCEDURE — 86147 CARDIOLIPIN ANTIBODY EA IG: CPT | Mod: 59

## 2020-10-20 PROCEDURE — 85025 COMPLETE CBC W/AUTO DIFF WBC: CPT

## 2020-10-20 PROCEDURE — 99205 OFFICE O/P NEW HI 60 MIN: CPT | Mod: S$GLB,,, | Performed by: INTERNAL MEDICINE

## 2020-10-20 PROCEDURE — 86705 HEP B CORE ANTIBODY IGM: CPT

## 2020-10-20 PROCEDURE — 85652 RBC SED RATE AUTOMATED: CPT

## 2020-10-20 PROCEDURE — 81374 HLA I TYPING 1 ANTIGEN LR: CPT | Mod: PO

## 2020-10-20 PROCEDURE — 86255 FLUORESCENT ANTIBODY SCREEN: CPT | Mod: 91

## 2020-10-20 NOTE — PROGRESS NOTES
Subjective:       Patient ID: Teresita Tate is a 40 y.o. female.    Chief Complaint: Disease Management    HPI  40 year F with no significant PMH here for evaluation.  Reports easy bruising since last year.   She saw hematologist for easy bruising. 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 She reports that light was giving her eye pain.  On occasion, her right eye burns, worse in both eyes.  Denies dryness of both eyes.   She has pain in right leg.  Reports right leg changes temperature.  Reports her right leg is worse when she exercises.   Reports she has pain in both knees.  Denies inflammation.  Denies any rashes, oral ulcers, hair loss, fevers, or photosensitivity.        Past Medical History:   Diagnosis Date    Uses Syriac as primary spoken language     Varicose vein of leg        Review of Systems   Constitutional: Positive for fatigue. Negative for activity change, appetite change, chills, diaphoresis, fever and unexpected weight change.   HENT: Negative for congestion, ear discharge, ear pain, facial swelling, mouth sores, sinus pressure, sneezing, sore throat, tinnitus and trouble swallowing.    Eyes: Negative for photophobia, pain, discharge, redness, itching and visual disturbance.   Respiratory: Negative for apnea, chest tightness, shortness of breath, wheezing and stridor.    Cardiovascular: Negative for leg swelling.   Gastrointestinal: Negative for abdominal distention, abdominal pain, anal bleeding, blood in stool, constipation, diarrhea and nausea.   Endocrine: Negative for cold intolerance and heat intolerance.   Genitourinary: Negative for difficulty urinating and dysuria.   Musculoskeletal: Positive for arthralgias. Negative for back pain, gait problem, joint swelling, myalgias, neck pain and neck stiffness.   Skin: Negative for color change, pallor, rash and wound.   Neurological: Negative for dizziness, seizures, light-headedness and numbness.   Hematological:  "Negative for adenopathy. Does not bruise/bleed easily.   Psychiatric/Behavioral: Negative for sleep disturbance. The patient is not nervous/anxious.            Objective:   BP 97/66   Pulse 69   Ht 5' 5" (1.651 m)   Wt 86.5 kg (190 lb 11.2 oz)   BMI 31.73 kg/m²      Physical Exam   Constitutional: She is oriented to person, place, and time.   HENT:   Head: Normocephalic and atraumatic.   Right Ear: External ear normal.   Left Ear: External ear normal.   Nose: Nose normal.   Mouth/Throat: Oropharynx is clear and moist. No oropharyngeal exudate.   Eyes: Conjunctivae and EOM are normal. Pupils are equal, round, and reactive to light. Right eye exhibits no discharge. Left eye exhibits no discharge. No scleral icterus.   Neck: Neck supple. No JVD present. No thyromegaly present.   Cardiovascular: Normal rate, regular rhythm, normal heart sounds and intact distal pulses.  Exam reveals no gallop and no friction rub.    No murmur heard.  Pulmonary/Chest: Effort normal and breath sounds normal. No respiratory distress. She has no wheezes. She has no rales. She exhibits no tenderness.   Abdominal: Soft. Bowel sounds are normal. She exhibits no distension and no mass. There is no abdominal tenderness. There is no rebound and no guarding.   Lymphadenopathy:     She has no cervical adenopathy.   Neurological: She is alert and oriented to person, place, and time. No cranial nerve deficit. Gait normal. Coordination normal.   Skin: Skin is dry. No rash noted. No erythema. No pallor.     Psychiatric: Affect and judgment normal.   Musculoskeletal: No tenderness, deformity or edema.          No data to display     Assessment:      40 year F with no significant PMH here for evaluation of joint pain and scleritis.  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  She continues to have redness of both eyes so will set her up with eye doctor.    With regards to scleritis, I will work her up for systemic " causes.  She reports myalgias and arthralgias but I do not see evidence of inflammatory arthritis on exam.  No diagnosis found.        Plan:       Problem List Items Addressed This Visit     None        optho consult  Labs  Consider pmr consult  One hour of face to face time spent with patient  *

## 2020-10-21 LAB
ACE SERPL-CCNC: 39 U/L (ref 16–85)
ANA SER QL IF: NORMAL
HBV CORE AB SERPL QL IA: NEGATIVE
HBV CORE IGM SERPL QL IA: NEGATIVE
HBV SURFACE AB SER-ACNC: NEGATIVE M[IU]/ML
HBV SURFACE AG SERPL QL IA: NEGATIVE
HCV AB SERPL QL IA: NEGATIVE

## 2020-10-22 LAB — LA PPP-IMP: NEGATIVE

## 2020-10-24 LAB
ANCA AB TITR SER IF: NORMAL TITER
B2 GLYCOPROT1 IGA SER QL: <9 SAU
B2 GLYCOPROT1 IGG SER QL: <9 SGU
B2 GLYCOPROT1 IGM SER QL: <9 SMU
CARDIOLIPIN IGG SER IA-ACNC: <9.4 GPL (ref 0–14.99)
CARDIOLIPIN IGM SER IA-ACNC: 9.9 MPL (ref 0–12.49)
P-ANCA TITR SER IF: NORMAL TITER

## 2020-10-31 LAB
HLA B27 INTERPRETATION: NORMAL
HLA-B27 RELATED AG QL: NEGATIVE
HLA-B27 RELATED AG QL: NORMAL

## 2020-11-18 ENCOUNTER — TELEPHONE (OUTPATIENT)
Dept: SPORTS MEDICINE | Facility: CLINIC | Age: 40
End: 2020-11-18

## 2020-11-18 DIAGNOSIS — Z71.3 NUTRITIONAL COUNSELING: Primary | ICD-10-CM

## 2020-11-18 NOTE — TELEPHONE ENCOUNTER
----- Message from Alexis Weilbaecher, RD sent at 11/18/2020 11:33 AM CST -----  Regarding: nutrition referral  Good Morning    Can you please enter a Nutrition Consult referral ([id number]) for Teresita Tate for Nutritional Counseling.  Thank you!    Alexis Weilbacher, RD

## 2020-12-01 ENCOUNTER — NUTRITION (OUTPATIENT)
Dept: NUTRITION | Facility: CLINIC | Age: 40
End: 2020-12-01
Payer: COMMERCIAL

## 2020-12-01 DIAGNOSIS — Z71.3 NUTRITIONAL COUNSELING: ICD-10-CM

## 2020-12-01 PROCEDURE — 97802 PR MED NUTR THER, 1ST, INDIV, EA 15 MIN: ICD-10-PCS | Mod: S$GLB,,, | Performed by: NUTRITIONIST

## 2020-12-01 PROCEDURE — 97802 MEDICAL NUTRITION INDIV IN: CPT | Mod: S$GLB,,, | Performed by: NUTRITIONIST

## 2020-12-01 PROCEDURE — 99999 PR PBB SHADOW E&M-EST. PATIENT-LVL I: CPT | Mod: PBBFAC,,, | Performed by: NUTRITIONIST

## 2020-12-01 PROCEDURE — 99999 PR PBB SHADOW E&M-EST. PATIENT-LVL I: ICD-10-PCS | Mod: PBBFAC,,, | Performed by: NUTRITIONIST

## 2020-12-01 NOTE — PROGRESS NOTES
"Nutrition Assessment for Initial Medical Nutrition Therapy      Reason for MNT visit: Pt in for education and nutrition counseling regarding nutritional counseling for goal of body fat loss      ASSESSMENT    Age: 40 y.o.  Wt: 180 lb  Wt Readings from Last 1 Encounters:   10/20/20 86.5 kg (190 lb 11.2 oz)     Ht: 5'5"  Ht Readings from Last 1 Encounters:   10/20/20 5' 5" (1.651 m)     BMI: 29.9  BMI Readings from Last 1 Encounters:   10/20/20 31.73 kg/m²       Clinical Signs/Symptoms: Patient stated she has very low Energy    Medical History:   Past Medical History:   Diagnosis Date    Uses Urdu as primary spoken language     Varicose vein of leg          Medications:   Current Outpatient Medications:     aspirin 325 MG tablet, Take 325 mg by mouth once daily., Disp: , Rfl:     chlorzoxazone (PARAFON FORTE) 500 mg Tab, Take 500 mg by mouth 4 (four) times daily as needed., Disp: , Rfl:     famotidine (PEPCID) 20 MG tablet, Take 1 tablet (20 mg total) by mouth 2 (two) times daily as needed. (Patient not taking: Reported on 10/20/2020), Disp: 20 tablet, Rfl: 0    Food allergies  Intolerances: NKFA    Labs:  Reviewed and noted  No results found for: HGBA1C  No results found for: CHOL, TRIG, HDL, LDLCALC    Typical day recall: reviewed and noted during consult    Beverages: water: 'tons'--several bottles throughout the day; coffee: 1-2 cups per day with 3-4 tablespoons cream and unsweetened almond milk; regular chocolate milk a couple times a week; unsweetened tea made with 1 Stevia packet 1-2x per day    Dining out: Infrequent, 1-2 times per week    Alcohol: nonsmoker; rarely drinks. She will have a few cocktails once a month    Lifestyle Influences  Support System: Her     Meal preparation/shopping: self    Current Activity Level: Some days are good and some are not so good. She has an elliptical and treadmill at home. Her goal is to do at least 45 minutes of walking 6 days per week    Patient " motivation, anticipated barriers, expected compliance: Patient is motivated and has verbalized understanding and intent to comply    DIAGNOSIS   Problem: Excessive Energy Intake  Etiology: RT eating too many calories  Signs/Symptoms: AEB 24 hour recall and BMI of 29.9    INTERVENTION  Nutrition prescription: estimated energy requirements:   Calories: 3484-4907  Protein:  grams  Carbohydrates: ~120 grams  Focus on Heart Healthy Fats  Fluid: 80 oz + sweat loss      Recommendations & Goals:  Patient goals and recommendations are tailored to the specific patient's needs, readiness to change, lifestyle, culture, skills, resources, & abilities. Strategies to help achieve these nutrition-related goals were discussed which can include but are not limited to SMART goal setting & mindful eating.     Aim for a minimum of 7 hours sleep   Exercise 60 minutes most days  Eat breakfast within 1-2 hours of waking up  Try not to skip any meals or snacks, not going more than 3-4 hours without eating.   At each meal and snack, try to include a source of fiber + lean protein + healthy fat.       Written Materials Provided  These resources are intended to assist the patient in making it easier to choose recommended options when eating out & to identify better-for-you brands at the grocery store:     Meal Planning Guide with recommendations discussed along with portion sizes and a customized meal plan    Eat Fit elise card as a reminder to download the elise to ones smart phone which provides: current Eat Fit partners, approved menu options, food labels for carb counting, & recipes    Nutritious On The Go Guide   Eat Fit Grocery Product list    RD contact information- for patient to contact regarding any questions, needs, and/or concerns that may arise     MONITORING & EVALUATION    Communicated with healthcare provider    Documented plan for referral to appropriate agency/healthcare provider as needed    Comprehension: fair      Motivation to change: moderate    Follow-up: January 2021    Counseling time: 2 Hours

## 2020-12-01 NOTE — PATIENT INSTRUCTIONS
Name: Teresita Tate  Date: 12.1.2020    Daily Energy Requirements:  Calories: 6377-6961  Protein:  grams  Carbohydrates: ~120 grams  Focus on Heart Healthy Fats  Fluid: 80 oz + sweat loss    Breakfast: 1 serving of carbs = 15-20 grams + at least 15 grams lean protein/heart healthy fat   1 piece of fruit or 1 cup chopped fruit + 2 whole eggs + tea sweetened with Stevia   [place]camore: 2 slices Ronnies Killer thin sliced bread + 3 oz turkey + slice of cheese + any non-starchy veggies    Snack: 1 serving of carbs = 15-20 grams + at least 15 grams lean protein/heart healthy fat   Needed if going >3-4 hours between meals   See afternoon snacks for options    Lunch: 4-5 oz lean protein + non-starchy veggies + 2 servings of carbs = 30-40 grams   Examples of 2 servings of carbs = 30-40 grams:  o 1 cup cooked pasta (see notes for brands); 2/3 cup cooked brown rice; 1 medium potato or sweet potato (5-6 oz in weight); 1 medium size piece of fruit + 2 slices Ronnies Killer thin sliced bread; 1 serving of whole grain chips (see notes) + 2 slices thin sliced whole grain bread; 2 slices 100-calorie bread; 1 serving of Beanito chips + ½ cup cooked whole grain starch; 1 cup cooked beans; ½ cup cooked beans + 1/3 cup cooked brown rice, etc  - The rest of your plate will consist of 4 oz lean protein + non-starchy veggies   Meal examples:  o 2/3 cup cooked rice + 4-5 oz lean protein + side of any non-starchy veggies  o 1 cup cooked lentil pasta (see notes) + 4-5 oz lean protein -or- if shrimp or crawfish then do 9-10 oz cooked shrimp or crawfish + side of non-starchy veggies/side salad  o Entrée Salad: Unlimited non-starchy veggies + 4-5 oz lean protein + 2 fats (see notes) + 1 cup fruit  o 2/3 cup cooked plantains + 4-5 oz lean protein + at least 1 cup non-starchy veggies  o Smoothie: See notes how to make a healthy smoothie    Snack: 1 serving of carbs = 15-20 grams + at least 15 grams lean protein/heart healthy  fat   1 slice Ronnies Killer thin sliced bread + 2 flat tablespoons peanut butter   ¼ cup nuts + piece of fruit   Piece of fruit + 2 tablespoons nut butter   Mini meal: 1/3 cup cooked rice + 3 oz lean protein (3 oz protein = size of a deck of cards)   ¾ cup cooked lentil pasta   Piece of fruit + 8 oz Fairlife chocolate milk   2 servings of Popcorners FLEX PROTEIN crisps (see notes)   1 cup Lifeway PROTEIN kefir à (1 cup  contains 160 calories, 20 grams carbs, 20 grams sugar and 20 grams protein)---Im okay with this one being a bit more sugar since most of it is natural sugar and this brand contains triple the protein compared to other kefir)   1 ¼ cup Special K PROTEIN cereal with unsweetened almond milk   Piece of fruit + 2 whole eggs    Dinner: Limit  No carbs  Note: If craving a carbohydrate, then have 1 serving of carbs = 15-20 grams = ½ cup cooked whole grain starch    Focus on 5 oz lean protein + any non-starchy veggies   Carb swaps:  o Hearts of palm linguini  - Brand example: Palmini   o Lasagna made with Palmini lasagna sheets à this one comes in a can  o Riced cauliflower à Can make from scratch + available in convenient frozen Steamables as well as shelf stable pouches  o Cauliflower mash to mimic mashed potatoes  o Riced broccoli   o Zoodles [I dont recommend buying these frozen---they get watery---I recommend spiralizing yourself]  o Spiralized beets  o Spaghetti squash  o  Outer Aisle Plantpower à makes pre-made frozen cauliflower pizza crust & wraps as well as frozen sandwich thins  -  Great for making low carb pizzas, burgers and sandwiches     Think outside the box for low carb dishes:  o Kabobs, stir arana with riced cauliflower or riced broccoli, stuffed bell peppers with no rice, lettuce wraps, eggplant lasagna, shrimp etouffee made with riced cauliflower, request a sushi roll that contains raw fish to be made without rice, etc  o Quest pizza à frozen pizza that is low carb  and high protein. Add additional side of non-starchy veggies  o Western Bagel: Perfect 10 à online only and must say Perfect 10   - 1 whole large bagel provides 20 grams protein and only 10 grams of carbohydrates    Optional post-dinner snack: Anything UP  calories à preferably protein containing   fun size   8 oz Fairlife chocolate milk   NSA (no sugar added) hot chocolate   1 cup Special K PROTEIN cereal with unsweetened almond or coconut milk              Nutrition Principles & Tips:    -Incorporate a source of lean protein, fiber, and heart healthy fat with each meal  - These take longer to digest, so we feel full longer    -Carbohydrates à main role is to provide us with Energy. There are two carbohydrate pages in your meal plan guidebook: (1 serving of carbohydrates = 15-20 grams)  1. Fruit page  2. Starches & Breads page    -Protein à Helps keep us alert and fill us up longer  o Refer to the Lean Meat  Meat Substitutes page of your meal planning guidebook   o Aim for your palm size/thickness portion for lunch and dinner    -Heart healthy fats à Helps us feel full longer + helps aid in nutrient absorption    -Eat every 3-4 hours à Snacks are important if youre going 4 or more hours between meals  o Importance of eating snacks:   - Needed Energy throughout the day  - Helps preserve lean muscle mass  - Increase metabolism since our body burns calories digesting food  - Helps us feel full longer so we dont overeat at the next meal = helps with portion control    -How to Make a Healthy Smoothie:   Choose a protein source:  o At least 6 oz Plain Greek yogurt or 2% cottage cheese  o Examples of plant-based protein powders:  - Orgain  - Martha  - Driver  - Garden of Life RAW  o 100% whey protein powder  o 2 scoops Vital Proteins Collagen Peptides   Add a small-medium size piece of fruit -or- ~1 cup chopped fresh or frozen unsweetened fruit   Add any non-starchy veggies   Choose a heart  healthy fat source: 1 tablespoon  o Nut butter (except Nutella)  o ¼ - ½ avocado   o Avocado oil    Choose a liquid:  o Unsweetened: almond milk  hemp milk  cashew milk  coconut milk  oat milk  o Fairlife lactose free milk (skim, 1% or 2%) -or- Organic Valley ULTRA reduced fat milk (lactose free)  o Water    Healthy add-ins for an extra nutritional boost:  o 1-2 tablespoons flaxseeds or nhi seeds   Add ice and blend!   o To step it up a notch, use frozen PLAIN cauliflower florets in place of ice for more nutrients      -What potentially increases inflammation/flare-ups in our body?   White/refined carbohydrates   Sugar (sugar = very inflammatory and may cause a flare up in our body)   Alcohol   Fried foods    Restaurant Tips:      Pick 2 out of the 5 Rule:  o Instead of eating bread (or tortilla chips), an appetizer, alcoholic drink and dessert, choose just one to have with your entrée   Focus on lean proteins: Refer to lean protein page in meal planning guidebook      Select items grilled, baked, broiled, braised, poached or roasted      Avoid crispy, crunchy, breaded, paneed or stuffed items      Avoid items that are cream based, au gratin or buttered      Order sauces, dressings and gravies on the side. This way you can add 1-2 Tbsp yourself  instead of the dish being drowned in the sauce  = portion control + saving calories while still enjoying the dish      Watch out for high calorie salad additives à   o A better-for-you salad consists of unlimited non-starchy veggies, lean protein and 2-3 salad additions, each additive being ~2-3 Tbsp (See below in notes for examples)       Hold the starchy side dish - request extra non-starchy vegetables instead      Dont drink your calories: avoid sugary soft drinks, juices and mixers   Note: Even 100% fruit juice contains the same sugar as a soft drink     [facility] Eat [facility] WARREN à Designed to take the guesswork out of dining out  healthfully, to make the healthy choice the easy choice   www.eatfitnola.com   Eat Fit Cookbook   [facility] Eat Fit elise à Free elise for smartphones  o Waitr and Uber Eats offer Eat Fit options  o Provides a list of all Eat Fit restaurants & dishes by location  - Lists what dishes are Eat Fit at each restaurant  - Lists the nutrition facts for each Eat Fit dish  - Provides 200 + Eat Fit approved recipes  - Grocery shopping guides + community wellness resources    -Salad Additives: Pick 2-3, each being ~2-3 Tbsp:  1. Dressing  2. Cheese  3. Nuts  4. Mathias  5. Croutons  6. Dried fruit  7. Avocado ( ½ portion)  8. Guacamole  9. Whole egg        Favorite Food Brands:  -100% Whole Grain Chips:   SunChips   Beanitos  Beanfields    -High Protein Chips: (All Are Gluten Free)   iWON ([facility], Vitamin Shoppe, Whole Foods, Card Capture Services)   Melvis Naturals Protein chips and pretzels (online only at www.[place])   Quest   Protes    -100% Whole Grain Crackers:   Triscuits (any flavor)   Popcorners FLEX Energy-packed protein crisps (10 grams protein per serving)    Ak-Yuval whole wheat sesame cracker   Iselin almond nut thins   Maegans Gone original cracker   Whisps Parmesan Cheese Crisps (contains only 1 gram carbohydrate per serving)   Crunchmaster protein sea salt cracker    -100% Whole Grain Breads:   Kenan Killer 21 whole grains and seeds (thin sliced) (bag label is green in color)   Kenan Killer Powerseed (thin sliced) (bag label is red in color)   Natures Own 100% whole wheat -or- Natures Own Double Fiber   Base Culture Bread: 7 Nut & Seed and Original Paleo Break  o Per slice: 110 calories and 4 grams net carbs    -Whole Grain Pancake  Waffle Mix:   Winterthur Cake 100% whole grain pancake/waffle mix (my favorite)   Flapjacked Protein pancake & baking mix (another favorite)    -Whole Grain Frozen Waffles:   KashiGO Protein Waffles   Vans Power Grains Original   Kashi 7-grain    -Whole Grain  Tortillas  Wraps:   Corn    Coconut wrap (typically found in refrigerated section by cheese)   SAGAR WARREN 100% whole wheat à 6   P28 high protein wraps (online only)    -Better-For-You Pasta:   Banza chickpea   Red Lentil    - Red Sauce in a Jar:   Shay & Negars Heart Smart Sauce (available flavors: Roasted Garlic, [place] or Original)   [place]s Finest Gourmet Foods Pasta Sauce   Classico Original   Engine 2 Plant-Strong      -Whole Grain Cereals that Meet the Marlo #7 Rule:   Melvis Naturals  o Online only à Flavors include honey almond, Citizen of the [place] vanilla and apple cinnamon   Special K PROTEIN    -Grab & Go Oatmeal Cups:   Powerful Overnight Oats   Marcoss [place]: GF Oatmeal with Flax and Venancio   Wild Friends: Oats & Nut Butter   Think Thin: Protein & Fiber Hot Oatmeal    -Oatmeal Packets:   Kashi Go Lean Creamy All-Natural Vanilla   Hinduism - Low Sugar    -Better-For-You Ice Cream  Ice Cream Bars:   Halo Top high protein ice cream pints    Artic Zero Fit Frozen Dessert   Halo Top pops   Enlightened ice cream bar   Enlightened Light ice cream   Yasso Frozen Greek yogurt bar    -Protein  Granola Bars that meet the Marlo #7 Rule:   Nature Valley Protein Chewy   Kashi Go Protein Bar   KIND Protein   Rx   Oatmega    -Granola: [Note: granola should be used as a topper for a crunch, and not as a main meal]   ProGranola by Maurice Edward (my favorite)   Engine 2: Original Granola    -Milk:   Favorite brands, which are also lactose free, that contain half the sugar + double the protein + lasts ~2 months in your fridge compared to regular milk:   Fairlife milk  (skim, 1% or 2%)  o The Fairlife reduced fat chocolate milk is okay also   Organic Valley ULTRA reduced fat milk     -Already Flavored Greek Yogurt:   Chobani Less Sugar   Oikos Triple Zero   Two Good    -Plant-Based High Protein Yogurt:   [place]- Vanilla + Plain unsweetened almond milk Greek style yogurt   Siggis  Coconut Blend Yogurt      -Success is a brand that makes a 10-minute boil in a bag brown rice  -No Sugar Added (NSA) jelly: Kayley    -BBQ Sauce:    SHELBY all natural    Primal Kitchen Classic BBQ sauce      Additional Nutrition Tips:  -Frozen meal guidelines:    Aim for ones that contain 45 grams or less of carbohydrates and at least 20 grams protein  o Note: If you find one you like that doesnt have 20 grams protein, you can add leftover lean protein to the frozen meal  o Eating Well is one of my favorite brands    -Rule of thumb for choosing aisle products: Marlo #7   Look for products that have <7 grams added sugar, and at least 7 grams or more protein    -Choose 100% whole wheat/whole grain products à Note: wheat bread and wheat flour are white bread/white flour    -Swerve sweetener à  0-calorie all-natural plant-based sweetener that is best for baked goods.  -Check out their website: www.swervesweet.com + our Eat Fit dessert section on the free [facility] Eat Fit elise for healthy dessert recipes     - Truvia à 0-calorie all-natural plant-based sweetener that is best for sweetening anything other than baked goods (in my opinion)   Examples: Use in plain Greek yogurt, cereals, smoothies, hot coffee, etc    -Unsweetened frozen fruits and veggies contain the same nutritional value as fresh fruit and veggies. I recommend keeping these as staples in your freezer     -Taking the skin off fried chicken is basically like eating grilled chicken   Compromise: If the skin is your favorite part, then eat your first piece of fried chicken without skin, and your last piece with skin   Note: Fried foods shouldnt be consumed more than twice a month    -Training the body is key and can take some time. Whatever you are currently doing for your meals/snacks and exercise, your body is used to itWhen changing a habit(s), it should be a little hard at first, then your body gets used to new habits over time   I  love your goal of 6 days per week for at least 45-60 minutes.    Note: Even 15 minutes is better than nothing 12    -Your body does not know exactly what time it is, so its okay to eat past 7:00 pm. Your body knows nutrients, not what time of the day it is     -Water is not only important for hydration, but also for feeling alert and more energized. If our body is even slightly dehydrated, that could make us feel more fatigued throughout the day   Aim for all daily fluids being half your body weight in oz + sweat loss    -I recommend measuring everything in cooked portions to see what each recommended portion looks like on your plate and bowls; Then eyeball after you feel comfortable with recommended portions    -I recommend keeping a food log for at least a couple days worth of meals and snacks    What to log?  o Time you eat each meal and snack  o What foods you eat (brands are helpful to keep note + exact meal)  o Portion sizes of each meal and snacks (Ex: [place]: 2 slices Natures Own 100% whole wheat bread + 4 oz deli sliced ham + 1 slice Sargento cheese, etc)    -Cholesterol on food labels has very little effect on our actual blood cholesterol levels. High animal-based fat foods may increase our LDL bad cholesterol over time   When looking in your meal planning guidebook, focus on consuming lean cuts of protein on your lean meat  meat substitutes list since this list as very little effect on your dietary cholesterol   Food cuts listed on the high fat meat  meat alternative list should be eaten at most twice a month since this list is high in animal-based fats and may increase our LDL cholesterol over time  o Note: 1 oz cheese portions is okay to have daily    -If youre having trouble finding a specific food product, try looking on their website. Most companies have a store /find a store on their website    -Cheat/Fried meals: Recommend 2x per month at most    -Supplements:   Vitamin  D3: 2,000 i.u per day    Fish Oil: Look for at least 1200 mg EPA + DHA per 2 capsules  o My favorite brand is Lorimors Naturals Ultimate Omega   One-A-Day MVI    -Next time you get bloodwork done; I would request having your Vitamin D level checked also    -To schedule a follow-up, please call [phone]. Just tell them you already saw me for an initial consult and you would like to use your insurance for a follow-up appt. I recommend making one for January 2021

## 2020-12-07 ENCOUNTER — PATIENT MESSAGE (OUTPATIENT)
Dept: RHEUMATOLOGY | Facility: CLINIC | Age: 40
End: 2020-12-07

## 2021-02-23 ENCOUNTER — PATIENT MESSAGE (OUTPATIENT)
Dept: RHEUMATOLOGY | Facility: CLINIC | Age: 41
End: 2021-02-23

## 2021-03-20 ENCOUNTER — IMMUNIZATION (OUTPATIENT)
Dept: PRIMARY CARE CLINIC | Facility: CLINIC | Age: 41
End: 2021-03-20

## 2021-03-20 DIAGNOSIS — Z23 NEED FOR VACCINATION: Primary | ICD-10-CM

## 2021-03-20 PROCEDURE — 0001A PR IMMUNIZ ADMIN, SARS-COV-2 COVID-19 VACC, 30MCG/0.3ML, 1ST DOSE: ICD-10-PCS | Mod: CV19,S$GLB,, | Performed by: INTERNAL MEDICINE

## 2021-03-20 PROCEDURE — 91300 PR SARS-COV- 2 COVID-19 VACCINE, NO PRSV, 30MCG/0.3ML, IM: CPT | Mod: S$GLB,,, | Performed by: INTERNAL MEDICINE

## 2021-03-20 PROCEDURE — 91300 PR SARS-COV- 2 COVID-19 VACCINE, NO PRSV, 30MCG/0.3ML, IM: ICD-10-PCS | Mod: S$GLB,,, | Performed by: INTERNAL MEDICINE

## 2021-03-20 PROCEDURE — 0001A PR IMMUNIZ ADMIN, SARS-COV-2 COVID-19 VACC, 30MCG/0.3ML, 1ST DOSE: CPT | Mod: CV19,S$GLB,, | Performed by: INTERNAL MEDICINE

## 2021-03-20 RX ADMIN — Medication 0.3 ML: at 08:03

## 2021-04-11 ENCOUNTER — IMMUNIZATION (OUTPATIENT)
Dept: PRIMARY CARE CLINIC | Facility: CLINIC | Age: 41
End: 2021-04-11
Payer: COMMERCIAL

## 2021-04-11 DIAGNOSIS — Z23 NEED FOR VACCINATION: Primary | ICD-10-CM

## 2021-04-11 PROCEDURE — 91300 PR SARS-COV- 2 COVID-19 VACCINE, NO PRSV, 30MCG/0.3ML, IM: ICD-10-PCS | Mod: S$GLB,,, | Performed by: INTERNAL MEDICINE

## 2021-04-11 PROCEDURE — 0002A PR IMMUNIZ ADMIN, SARS-COV-2 COVID-19 VACC, 30MCG/0.3ML, 2ND DOSE: ICD-10-PCS | Mod: CV19,S$GLB,, | Performed by: INTERNAL MEDICINE

## 2021-04-11 PROCEDURE — 91300 PR SARS-COV- 2 COVID-19 VACCINE, NO PRSV, 30MCG/0.3ML, IM: CPT | Mod: S$GLB,,, | Performed by: INTERNAL MEDICINE

## 2021-04-11 PROCEDURE — 0002A PR IMMUNIZ ADMIN, SARS-COV-2 COVID-19 VACC, 30MCG/0.3ML, 2ND DOSE: CPT | Mod: CV19,S$GLB,, | Performed by: INTERNAL MEDICINE

## 2021-04-11 RX ADMIN — Medication 0.3 ML: at 07:04

## 2022-03-18 ENCOUNTER — OFFICE VISIT (OUTPATIENT)
Dept: URGENT CARE | Facility: CLINIC | Age: 42
End: 2022-03-18
Payer: COMMERCIAL

## 2022-03-18 VITALS
DIASTOLIC BLOOD PRESSURE: 69 MMHG | OXYGEN SATURATION: 100 % | SYSTOLIC BLOOD PRESSURE: 114 MMHG | HEIGHT: 65 IN | BODY MASS INDEX: 31.65 KG/M2 | WEIGHT: 190 LBS | HEART RATE: 84 BPM | RESPIRATION RATE: 18 BRPM | TEMPERATURE: 99 F

## 2022-03-18 DIAGNOSIS — R09.81 CONGESTION OF PARANASAL SINUS: ICD-10-CM

## 2022-03-18 DIAGNOSIS — J06.9 UPPER RESPIRATORY VIRUS: Primary | ICD-10-CM

## 2022-03-18 LAB
CTP QC/QA: YES
SARS-COV-2 RDRP RESP QL NAA+PROBE: NEGATIVE

## 2022-03-18 PROCEDURE — 3078F DIAST BP <80 MM HG: CPT | Mod: CPTII,S$GLB,, | Performed by: NURSE PRACTITIONER

## 2022-03-18 PROCEDURE — 3008F BODY MASS INDEX DOCD: CPT | Mod: CPTII,S$GLB,, | Performed by: NURSE PRACTITIONER

## 2022-03-18 PROCEDURE — 3078F PR MOST RECENT DIASTOLIC BLOOD PRESSURE < 80 MM HG: ICD-10-PCS | Mod: CPTII,S$GLB,, | Performed by: NURSE PRACTITIONER

## 2022-03-18 PROCEDURE — 99203 OFFICE O/P NEW LOW 30 MIN: CPT | Mod: S$GLB,CS,, | Performed by: NURSE PRACTITIONER

## 2022-03-18 PROCEDURE — 1159F MED LIST DOCD IN RCRD: CPT | Mod: CPTII,S$GLB,, | Performed by: NURSE PRACTITIONER

## 2022-03-18 PROCEDURE — 3074F SYST BP LT 130 MM HG: CPT | Mod: CPTII,S$GLB,, | Performed by: NURSE PRACTITIONER

## 2022-03-18 PROCEDURE — 3074F PR MOST RECENT SYSTOLIC BLOOD PRESSURE < 130 MM HG: ICD-10-PCS | Mod: CPTII,S$GLB,, | Performed by: NURSE PRACTITIONER

## 2022-03-18 PROCEDURE — 99203 PR OFFICE/OUTPT VISIT, NEW, LEVL III, 30-44 MIN: ICD-10-PCS | Mod: S$GLB,CS,, | Performed by: NURSE PRACTITIONER

## 2022-03-18 PROCEDURE — 1160F PR REVIEW ALL MEDS BY PRESCRIBER/CLIN PHARMACIST DOCUMENTED: ICD-10-PCS | Mod: CPTII,S$GLB,, | Performed by: NURSE PRACTITIONER

## 2022-03-18 PROCEDURE — 1160F RVW MEDS BY RX/DR IN RCRD: CPT | Mod: CPTII,S$GLB,, | Performed by: NURSE PRACTITIONER

## 2022-03-18 PROCEDURE — 3008F PR BODY MASS INDEX (BMI) DOCUMENTED: ICD-10-PCS | Mod: CPTII,S$GLB,, | Performed by: NURSE PRACTITIONER

## 2022-03-18 PROCEDURE — U0002: ICD-10-PCS | Mod: QW,S$GLB,, | Performed by: NURSE PRACTITIONER

## 2022-03-18 PROCEDURE — 1159F PR MEDICATION LIST DOCUMENTED IN MEDICAL RECORD: ICD-10-PCS | Mod: CPTII,S$GLB,, | Performed by: NURSE PRACTITIONER

## 2022-03-18 PROCEDURE — U0002 COVID-19 LAB TEST NON-CDC: HCPCS | Mod: QW,S$GLB,, | Performed by: NURSE PRACTITIONER

## 2022-03-18 RX ORDER — AZELASTINE 1 MG/ML
1 SPRAY, METERED NASAL 2 TIMES DAILY PRN
Qty: 30 ML | Refills: 0 | Status: SHIPPED | OUTPATIENT
Start: 2022-03-18

## 2022-03-18 RX ORDER — ACETAMINOPHEN AND CODEINE PHOSPHATE 120; 12 MG/5ML; MG/5ML
0.35 SOLUTION ORAL
COMMUNITY
Start: 2022-02-07

## 2022-03-18 NOTE — PROGRESS NOTES
"Subjective:       Patient ID: Teresita Tate is a 41 y.o. female.    Vitals:  height is 5' 5" (1.651 m) and weight is 86.2 kg (190 lb). Her oral temperature is 98.9 °F (37.2 °C). Her blood pressure is 114/69 and her pulse is 84. Her respiration is 18 and oxygen saturation is 100%.     Chief Complaint: SOY    This is a 41 y.o. female who presents today with a chief complaint of  headache, sore throat, watery eyes & nasal congestion since Monday.Pt has been taking Theraflu, denies exposure to COVID-19, denies fever, body aches or chills, denies cough, wheezing or shortness of breath, denies nausea, vomiting, diarrhea or abdominal pain, denies chest pain or dizziness positional lightheadedness, denies trouble swallowing, denies loss of taste or smell, or any other symptoms  Wilman used for communication throughout visit      Cough  This is a new problem. The current episode started in the past 7 days. The problem has been rapidly worsening. The problem occurs constantly. The cough is productive of purulent sputum. Associated symptoms include chills, headaches, nasal congestion, postnasal drip, rhinorrhea and a sore throat. Pertinent negatives include no chest pain, fever, shortness of breath or wheezing. Nothing aggravates the symptoms. She has tried OTC cough suppressant for the symptoms. The treatment provided no relief. There is no history of environmental allergies.       Constitution: Positive for chills. Negative for sweating, fatigue and fever.   HENT: Positive for postnasal drip and sore throat. Negative for sinus pain and sinus pressure.    Cardiovascular: Negative for chest pain.   Respiratory: Negative for chest tightness, cough, sputum production, shortness of breath and wheezing.    Gastrointestinal: Negative for abdominal pain, nausea, vomiting, constipation and diarrhea.   Allergic/Immunologic: Negative for environmental allergies and seasonal allergies.   Neurological: Positive for headaches. " Negative for dizziness and light-headedness.       Objective:      Physical Exam   Constitutional: She is oriented to person, place, and time. She appears well-developed. She is cooperative.  Non-toxic appearance. She does not appear ill. No distress.   HENT:   Head: Normocephalic and atraumatic.   Ears:   Right Ear: Hearing, tympanic membrane, external ear and ear canal normal. No middle ear effusion.   Left Ear: Hearing, tympanic membrane, external ear and ear canal normal.  No middle ear effusion.   Nose: Nose normal. No mucosal edema, rhinorrhea or nasal deformity. No epistaxis. Right sinus exhibits no maxillary sinus tenderness and no frontal sinus tenderness. Left sinus exhibits no maxillary sinus tenderness and no frontal sinus tenderness.   Mouth/Throat: Uvula is midline, oropharynx is clear and moist and mucous membranes are normal. No trismus in the jaw. Normal dentition. No uvula swelling. No oropharyngeal exudate, posterior oropharyngeal edema or posterior oropharyngeal erythema.   Eyes: Conjunctivae and lids are normal. No scleral icterus.      extraocular movement intact   Neck: Trachea normal and phonation normal. Neck supple. No edema present. No erythema present. No neck rigidity present.   Cardiovascular: Normal rate, regular rhythm, normal heart sounds and normal pulses.   Pulmonary/Chest: Effort normal and breath sounds normal. No respiratory distress. She has no decreased breath sounds. She has no rhonchi.   Abdominal: Normal appearance.   Musculoskeletal: Normal range of motion.         General: No deformity. Normal range of motion.   Lymphadenopathy:     She has no cervical adenopathy.   Neurological: no focal deficit. She is alert and oriented to person, place, and time. She exhibits normal muscle tone. Coordination normal.   Skin: Skin is warm, dry, intact, not diaphoretic and not pale.   Psychiatric: Her speech is normal and behavior is normal. Judgment and thought content normal.   Nursing  note and vitals reviewed.        Results for orders placed or performed in visit on 03/18/22   POCT COVID-19 Rapid Screening   Result Value Ref Range    POC Rapid COVID Negative Negative     Acceptable Yes        Patient in no acute distress.  Vitals reassuring.  Negative COVID-19 results discussed with patient detail.  Detailed education provided about COVID 19 precautions and recommendations as per CDC guidelines.  Medication prescribed and over-the-counter medications discussed.  Discussed results/diagnosis/plan in depth with patient in clinic. Strict precautions given to patient to monitor for worsening signs and symptoms. Advised to follow up with primary.All questions answered. Strict ER precautions given. If your symptoms worsens or fail to improve you should go to the Emergency Room. Discharge and follow-up instructions given verbally/printed. Discharge and follow-up instructions discussed with the patient who expressed understanding and willingness to comply with my recommendations.Patient voiced understanding and in agreement with current treatment plan.     Please be advised this text was dictated with [employer] software and may contain errors due to translation.      Assessment:       1. Upper respiratory virus    2. Congestion of paranasal sinus          Plan:         Upper respiratory virus  -     POCT COVID-19 Rapid Screening    Congestion of paranasal sinus    Other orders  -     azelastine (ASTELIN) 137 mcg (0.1 %) nasal spray; 1 spray (137 mcg total) by Nasal route 2 (two) times daily as needed for Rhinitis.  Dispense: 30 mL; Refill: 0                 Patient Instructions   PLEASE READ YOUR DISCHARGE INSTRUCTIONS ENTIRELY AS IT CONTAINS IMPORTANT INFORMATION.      Please drink plenty of fluids.    Please get plenty of rest.    Please return here or go to the Emergency Department for any concerns or worsening of condition.    Please take an over the counter antihistamine medication  (allegra/Claritin/Zyrtec) of your choice as directed.    Try an over the counter decongestant like Mucinex D or Sudafed. You buy this behind the pharmacy counter    If you do have Hypertension or palpitations, it is safe to take Coricidin HBP for relief of sinus symptoms.    If not allergic, please take over the counter Tylenol (Acetaminophen) and/or Motrin (Ibuprofen) as directed for control of pain and/or fever.  Please follow up with your primary care doctor or specialist as needed.    Sore throat recommendations: Warm fluids, warm salt water gargles, throat lozenges, tea, honey, soup, rest, hydration.    Use over the counter flonase: one spray each nostril twice daily OR two sprays each nostril once daily.     If you  smoke, please stop smoking.      Please return or see your primary care doctor if you develop new or worsening symptoms.     Please arrange follow up with your primary medical clinic as soon as possible. You must understand that you've received an Urgent Care treatment only and that you may be released before all of your medical problems are known or treated. You, the patient, will arrange for follow up as instructed. If your symptoms worsen or fail to improve you should go to the Emergency Room.

## 2023-11-28 ENCOUNTER — TELEPHONE (OUTPATIENT)
Dept: ADMINISTRATIVE | Facility: HOSPITAL | Age: 43
End: 2023-11-28
Payer: COMMERCIAL

## 2023-11-29 ENCOUNTER — NUTRITION (OUTPATIENT)
Dept: NUTRITION | Facility: CLINIC | Age: 43
End: 2023-11-29
Payer: COMMERCIAL

## 2023-11-29 VITALS — BODY MASS INDEX: 34.93 KG/M2 | WEIGHT: 209.69 LBS | HEIGHT: 65 IN

## 2023-11-29 DIAGNOSIS — Z71.3 DIETARY COUNSELING: ICD-10-CM

## 2023-11-29 DIAGNOSIS — E66.9 OBESITY (BMI 30-39.9): ICD-10-CM

## 2023-11-29 DIAGNOSIS — R73.03 PREDIABETES: Primary | ICD-10-CM

## 2023-11-29 PROCEDURE — 97802 MEDICAL NUTRITION INDIV IN: CPT | Mod: S$GLB,,, | Performed by: DIETITIAN, REGISTERED

## 2023-11-29 PROCEDURE — 99999 PR PBB SHADOW E&M-EST. PATIENT-LVL III: ICD-10-PCS | Mod: PBBFAC,,,

## 2023-11-29 PROCEDURE — 99999 PR PBB SHADOW E&M-EST. PATIENT-LVL III: CPT | Mod: PBBFAC,,,

## 2023-11-29 PROCEDURE — 97802 PR MED NUTR THER, 1ST, INDIV, EA 15 MIN: ICD-10-PCS | Mod: S$GLB,,, | Performed by: DIETITIAN, REGISTERED

## 2023-11-29 NOTE — PATIENT INSTRUCTIONS
1600 calorie, carbohydrate controlled, low fat, high fiber diet.  Exercise goal:  30 minutes per day, 3-5 days per week.

## 2023-11-29 NOTE — PROGRESS NOTES
" Referring Physician:Alexis Magdaleno MD     Reason for visit:  Chief Complaint   Patient presents with    Obesity    prediabetes    Nutrition Counseling      Initial Visit   Pt declined use of     :1980     Allergies Reviewed  Meds Reviewed    Anthropometrics  Weight:95.1 kg (209 lb 10.5 oz) - standing scale in clinic  Height:5' 5" (1.651 m)  BMI:Body mass index is 34.89 kg/m².   IBW: 57 kg  +/-10%    Meds:  Outpatient Medications Prior to Visit   Medication Sig Dispense Refill    aspirin 325 MG tablet Take 325 mg by mouth once daily.      azelastine (ASTELIN) 137 mcg (0.1 %) nasal spray 1 spray (137 mcg total) by Nasal route 2 (two) times daily as needed for Rhinitis. 30 mL 0    chlorzoxazone (PARAFON FORTE) 500 mg Tab Take 500 mg by mouth 4 (four) times daily as needed.      famotidine (PEPCID) 20 MG tablet Take 1 tablet (20 mg total) by mouth 2 (two) times daily as needed. (Patient not taking: Reported on 10/20/2020) 20 tablet 0    norethindrone (MICRONOR) 0.35 mg tablet Take 0.35 mg by mouth.       No facility-administered medications prior to visit.       Food/Drug Interactions Noted:  n/a    Vitamins/Supplements/Herbs:  n/a    Labs:  outside referral; no labs available.  Pt states her latest HgbA1c was under 6.0 but she doesn't remember the exact number     Nutrition Prescription:  1710 Kcals/day( 30 kcal/kg IBW),  46-57 g protein( 0.8-1.0 g/kg IBW)     Support System:   pt does the grocery shopping and cooking for herself, her  and 8 yo daughter.  Pt is from [place] and states her native diet is high in Carbs ie rice, beans, fried plantains.    Diet Hx:  pt is lactose-intolerant, and has gastritis.  Brought her blood sugar log; takes her blood sugar before breakfast and 2 hours after every morning.  States she is concerned with hypoglycemia.       Breakfast: protein pancake with eggs.  Coffee with powdered creamer and stevia    Snack:  protein shake (protein powder mixed with " "water)  Lunch: today had zucchini with meat & beans.  Green tea with lemon.  Usually has rice, fried plantains, potato salad.  Dinner:   at 4 pm yesterday had ham & cheese sandwich on [employer] bread with coffee.                 At 9 pm had 4 crackers with ham & cheese and Nesquick    Current activity level and/or physical limitations:  no exercise for past 6 months.  States her  bought her a treadmill and an elliptical and asked her "what else can I do to support you?"    Motivation to make changes/anticipated barriers and/or expected adherence:  pt states she has strong family history of diabetes, and would like to avoid diabetes medication.  States she will try to follow recommendations for carb control and increase physical activity    Nutrition-Focus Physical Findings:  pt appears well nourished    Assessment:  pt very attentive and asked relevant questions about foods/beverages recommended and to avoid; reading food labels; sample meal plans and menus; portion control; healthy snacking; grocery shopping and cooking tips; role of exercise in weight management and blood sugar control.  All of her questions were answered, and she verbalized understanding of information.    Nutrition Diagnosis:  Food- and nutrition-related knowledge deficit RT lack of prior exposure to information AEB dx prediabetes/obesity      Recommendations:  1600 calorie, carbohydrate controlled, low fat, high fiber diet. Exercise goal:  30 minutes per day, 3-5 days per week.  Handouts in Welsh provided & reviewed:  Mi planificador de plato; Terapia nutritional cardiaca-TLC; Consejos para leer etiquetas saludables para el fuad; Ejemplos de menus de 1,6000 calorias; Recuento de carbohidratos; Porciones de carbohidratos; Porciones sin carbohidratos; Opciones de refrigerios; Opciones de meriendas; Actividad Fisica; Recursos para el control de la diabetes "Nuestros 5 Favoritos"; English:  1200 calorie sample 5-Day " menus      Strategies Implemented:   portion control; read food labels; increase physical activity    Consultation Time:45 minutes.  Communicated with referring healthcare provider:  Consult note available in pt's Epic chart per MD discretion  Follow Up:  Pt provided with dietitian contact information and advised to contact me with questions or make future appointment if further intervention needed.

## 2025-02-10 ENCOUNTER — OFFICE VISIT (OUTPATIENT)
Dept: URGENT CARE | Facility: CLINIC | Age: 45
End: 2025-02-10
Payer: COMMERCIAL

## 2025-02-10 VITALS
OXYGEN SATURATION: 98 % | SYSTOLIC BLOOD PRESSURE: 114 MMHG | RESPIRATION RATE: 16 BRPM | TEMPERATURE: 98 F | DIASTOLIC BLOOD PRESSURE: 79 MMHG | WEIGHT: 209.44 LBS | HEIGHT: 65 IN | HEART RATE: 78 BPM | BODY MASS INDEX: 34.89 KG/M2

## 2025-02-10 DIAGNOSIS — M79.18 MYALGIA, MULTIPLE SITES: ICD-10-CM

## 2025-02-10 DIAGNOSIS — M25.50 POLYARTHRALGIA: ICD-10-CM

## 2025-02-10 DIAGNOSIS — L50.9 URTICARIA OF UNKNOWN ORIGIN: Primary | ICD-10-CM

## 2025-02-10 DIAGNOSIS — L29.9 PRURITUS: ICD-10-CM

## 2025-02-10 LAB
CTP QC/QA: YES
POC MOLECULAR INFLUENZA A AGN: NEGATIVE
POC MOLECULAR INFLUENZA B AGN: NEGATIVE

## 2025-02-10 PROCEDURE — 87502 INFLUENZA DNA AMP PROBE: CPT | Mod: QW,S$GLB,, | Performed by: PHYSICIAN ASSISTANT

## 2025-02-10 PROCEDURE — 99214 OFFICE O/P EST MOD 30 MIN: CPT | Mod: 25,S$GLB,, | Performed by: PHYSICIAN ASSISTANT

## 2025-02-10 PROCEDURE — 96372 THER/PROPH/DIAG INJ SC/IM: CPT | Mod: S$GLB,,, | Performed by: PHYSICIAN ASSISTANT

## 2025-02-10 RX ORDER — DEXAMETHASONE SODIUM PHOSPHATE 10 MG/ML
10 INJECTION INTRAMUSCULAR; INTRAVENOUS ONCE
Status: COMPLETED | OUTPATIENT
Start: 2025-02-10 | End: 2025-02-10

## 2025-02-10 RX ORDER — HYDROXYZINE PAMOATE 25 MG/1
25 CAPSULE ORAL EVERY 8 HOURS PRN
Qty: 30 CAPSULE | Refills: 0 | Status: SHIPPED | OUTPATIENT
Start: 2025-02-10 | End: 2025-02-20

## 2025-02-10 RX ORDER — TRIAMCINOLONE ACETONIDE 1 MG/G
OINTMENT TOPICAL 2 TIMES DAILY PRN
Qty: 80 G | Refills: 0 | Status: SHIPPED | OUTPATIENT
Start: 2025-02-10 | End: 2025-02-20

## 2025-02-10 RX ORDER — IBUPROFEN 800 MG/1
800 TABLET ORAL EVERY 6 HOURS PRN
Qty: 28 TABLET | Refills: 0 | Status: SHIPPED | OUTPATIENT
Start: 2025-02-10 | End: 2025-02-17

## 2025-02-10 RX ADMIN — DEXAMETHASONE SODIUM PHOSPHATE 10 MG: 10 INJECTION INTRAMUSCULAR; INTRAVENOUS at 09:02

## 2025-02-10 NOTE — PATIENT INSTRUCTIONS
"Chronic spontaneous urticaria (CSU): Can cause recurrent unexplained fever, joint, bone, or muscle pain, and malaise.   Urticarial vasculitis: A more severe type of urticaria that can cause joint pain, fatigue, abdominal pain, and more.   If you experience body aches along with urticaria (hives) after exposure to hot water, it's likely due to a condition called "cholinergic urticaria," which means your body is reacting to an increase in body temperature from things like hot showers or exercise, causing hives to appear alongside potential muscle aches.      Recommended to use nonscented detergents (All Free and clear), body washes (Dove sensitive skin), and body lotions  (Cerave, Cetaphil).     Use Triamcinolone 0.1% ointment BID to body prn rash.   Counseling on topical steroids:  Patient counseled that the prolonged use of topical steroids can result in the increased appearance superficial blood vessels (telangiectasias), lightening (hypopigmentation), and thinning of the skin (atrophy). Patient understands to avoid using high potency steroids in skin folds, the groin, and the face.  The patient verbalized understanding of proper use and possible adverse effects of topical steroids.      Use Hydroxyzine 25 mg po TID prn pruritus. If it makes you sleepy, take an oral 2nd grass generation antihistamines (Claritin/Zyrtec/Allegra) during the day and oral 1st class generation anti-histamines (hydroxyzine/benadryl) at night. Antihistamines are diphenhydramine (Benadryl),  hydroxyzine (Atarax), loratadine (Claritin), cetirizine (Zyrtec), and fexofenadine (Allegra), and levocetirizine (Xyzal).     If symptoms are not improving, add in another anti-histamine agent such as Ranitidine (zantac).  This is used in acid reflux; it helps reduce histamine release.     Systemic glucocorticoids may be added to antihistamine therapy for patients with prominent angioedema or if symptoms persist beyond a few days.   Discussed adverse " side effects of recurrent/long term steroid use: elevated blood pressure elevated blood sugar, pancreatitis,glaucoma/cataracts, weight gain in face/abdomen/neck, round face (moon face), fluid retention in legs/lungs, mood swings, upset stomach, increased risk of infections, osteoporosis and increased risk of fractures, fatigue, loss of appetite, nausea and muscle weakness, thin skin, bruising and slower wound healing.    Patients who are suspected of having an allergic etiology causing new-onset urticaria, such as a food or medication allergy, should be referred to an allergy specialist who will evaluated for possible causes and equip the patient with epinephrine for self-injection when indicated.        If you were prescribed a narcotic or muscle relaxer (Flexeril or Robaxin), do not drive or operate heavy equipment or machinery while taking these medications. These medications can make you drowsy. If you are driving, it is recommended to take ibuprofen TID prn pain and take flexeril/robaxin at night.  If not allergic, take Tylenol (Acetaminophen) 650 mg to  1 g every 6 hours as needed as needed for fever/pain and/or Motrin (Ibuprofen) 600 to 800 mg every 6 hours as needed for pain and/or fever      Please remember that you have received care at an urgent care today. Urgent cares are not emergency rooms and are not equipped to handle life threatening emergencies and cannot rule in or out certain medical conditions and you may be released before all of your medical problems are known or treated. Please arrange follow up with your primary care physician or speciality clinic  within 2-5 days if your signs and symptoms have not resolved or worsen. Patient can call our Referral Hotline at [phone] to make an appointment.    Please return here or go to the Emergency Department for any concerns or worsening of condition.Patient was educated on signs/symptoms that would warrant emergent medical attention. Patient  verbalized understanding.  You start to have severe trouble breathing or swallowing (for example, you cannot speak in full sentences).  The rash spreads over large parts of your body and most of your skin becomes red.  It is becoming hard to breathe, but you can still talk in full sentences.  You have a fever of 100.4°F (38°C) or higher or chills.  You have signs of a wound infection like swelling, redness, warmth, pain, or drainage from the wound.

## 2025-02-10 NOTE — PROGRESS NOTES
"Subjective:      Patient ID: Teresita Wheeler is a 44 y.o. female.    Vitals:  height is 5' 5" (1.651 m) and weight is 95 kg (209 lb 7 oz). Her oral temperature is 98 °F (36.7 °C). Her blood pressure is 114/79 and her pulse is 78. Her respiration is 16 and oxygen saturation is 98%.     Chief Complaint: Rash    Teresita Wheeler is a 44 y.o. female who complains of rash on both legs, arms and belly. Pt also having pain in legs, neck head, RT shoulder and arm. Sx started 3 days ago. And pain started yesterday.  Tx include Advil with no relief.     Provider HPI  Rash and pruritus started Friday.  states pt didn't have any new foods (egg, sausage, chicken, cheese) at work. Rash resolved but pt is still itchy.  Rash was worse after a hot shower. Pt reports leg pain.     Rash  This is a new problem. The current episode started in the past 7 days. The problem has been rapidly improving since onset. The affected locations include the right lower leg, left lower leg, abdomen, right arm and left arm. The rash is characterized by itchiness and redness. She was exposed to nothing. Associated symptoms include joint pain. Pertinent negatives include no anorexia, congestion, cough, diarrhea, eye pain, facial edema, fatigue, fever, nail changes, rhinorrhea, shortness of breath, sore throat or vomiting. Past treatments include nothing. The treatment provided mild relief. There is no history of allergies, asthma, eczema or varicella.     Constitution: Negative for fatigue and fever.   HENT:  Negative for congestion and sore throat.    Neck: Positive for neck pain.   Eyes:  Negative for eye pain.   Respiratory:  Negative for cough and shortness of breath.    Gastrointestinal:  Negative for vomiting and diarrhea.   Musculoskeletal:  Positive for pain, back pain and muscle ache. Negative for trauma, joint pain, joint swelling, abnormal ROM of joint, arthritis, gout, muscle cramps and history of spine disorder.   Skin:  Positive for " rash, erythema and hives.   Allergic/Immunologic: Positive for hives and itching. Negative for environmental allergies, seasonal allergies, food allergies, immunocompromised state and sneezing.      Objective:     Physical Exam   Constitutional: She is oriented to person, place, and time. No distress.      Comments:Patient is awake and alert, sitting up in exam chair, speaking and answering in complete sentences     normal  HENT:   Head: Normocephalic and atraumatic.   Ears:   Right Ear: Tympanic membrane, external ear and ear canal normal.   Left Ear: Tympanic membrane, external ear and ear canal normal.   Nose: Nose normal.   Mouth/Throat: Mucous membranes are moist. Oropharynx is clear.   Eyes: Conjunctivae are normal. Pupils are equal, round, and reactive to light. Extraocular movement intact   Neck: Neck supple.   Cardiovascular: Normal rate, regular rhythm, normal heart sounds and normal pulses.   Pulmonary/Chest: Effort normal and breath sounds normal.   Abdominal: Normal appearance.   Neurological: She is alert and oriented to person, place, and time.   Skin: Skin is warm. erythema         Comments: Erythema to left lower leg   Psychiatric: Her behavior is normal.   Nursing note and vitals reviewed.      Assessment:     1. Urticaria of unknown origin    2. Myalgia, multiple sites    3. Pruritus    4. Polyarthralgia      Patient presents with clinical exam findings and history consistent with above.      On exam, patient is nontoxic appearing and vitals are stable.      Diagnostic testing results were reviewed and discussed with patient/guardian.   Tests ordered in clinic:  Results for orders placed or performed in visit on 02/10/25   POCT Influenza A/B MOLECULAR    Collection Time: 02/10/25  9:28 AM   Result Value Ref Range    POC Molecular Influenza A Ag Negative Negative    POC Molecular Influenza B Ag Negative Negative     Acceptable Yes          Previous progress notes/admissions/labs and  medications were reviewed.  Reviewed GFR > 60 from CMP on 10/20/2020. Patient was seen by rheumatology in 10/20/2020 for polyarthralgia. Rheumatology workup was negative RF, ARCHIE, Complement C4 and C3, HLA B27 antigen.     Plan:   Patient was seen by rheumatology in 10/20/2020 for polyarthralgia. Recommend Rheumatology workup.    Urticaria of unknown origin  -     triamcinolone acetonide 0.1% (KENALOG) 0.1 % ointment; Apply topically 2 (two) times daily as needed (itchy rash).  Dispense: 80 g; Refill: 0  -     Ambulatory referral/consult to Allergy  -     Ambulatory referral/consult to Internal Medicine    Myalgia, multiple sites  -     POCT Influenza A/B MOLECULAR  -     Ambulatory referral/consult to Internal Medicine  -     ibuprofen (ADVIL,MOTRIN) 800 MG tablet; Take 1 tablet (800 mg total) by mouth every 6 (six) hours as needed for Pain or Temperature greater than (100F).  Dispense: 28 tablet; Refill: 0  -     Ambulatory referral/consult to Rheumatology    Pruritus  -     POCT Influenza A/B MOLECULAR  -     dexAMETHasone injection 10 mg  -     triamcinolone acetonide 0.1% (KENALOG) 0.1 % ointment; Apply topically 2 (two) times daily as needed (itchy rash).  Dispense: 80 g; Refill: 0  -     hydrOXYzine pamoate (VISTARIL) 25 MG Cap; Take 1 capsule (25 mg total) by mouth every 8 (eight) hours as needed (itchiness/anxiety (can cause drowsiness)).  Dispense: 30 capsule; Refill: 0    Polyarthralgia  -     Ambulatory referral/consult to Rheumatology                    1) See orders for this visit as documented in the electronic medical record.  2) Symptomatic therapy suggested: use acetaminophen/ibuprofen every 6-8 hours prn pain or fever, push fluids.   3) Call or return to clinic prn if these symptoms worsen or fail to improve as anticipated.    Discussed results/diagnosis/plan with patient in clinic.  We had shared decision making for patient's treatment. Patient verbalized understanding and in agreement with  "current treatment plan.     Patient was instructed to return for re-evaluation with urgent care or PCP for continued outpatient workup and management if symptoms do not improve/worsening symptoms. Strict ED versus clinic precautions given in depth.    Discharge and follow-up instructions given verbally/printed with the patient who expressed understanding. The instructions and results are also available on [facility].              Tanja "Stacy" LULU Elias          Patient Instructions   Chronic spontaneous urticaria (CSU): Can cause recurrent unexplained fever, joint, bone, or muscle pain, and malaise.   Urticarial vasculitis: A more severe type of urticaria that can cause joint pain, fatigue, abdominal pain, and more.   If you experience body aches along with urticaria (hives) after exposure to hot water, it's likely due to a condition called "cholinergic urticaria," which means your body is reacting to an increase in body temperature from things like hot showers or exercise, causing hives to appear alongside potential muscle aches.      Recommended to use nonscented detergents (All Free and clear), body washes (Dove sensitive skin), and body lotions  (Cerave, Cetaphil).     Use Triamcinolone 0.1% ointment BID to body prn rash.   Counseling on topical steroids:  Patient counseled that the prolonged use of topical steroids can result in the increased appearance superficial blood vessels (telangiectasias), lightening (hypopigmentation), and thinning of the skin (atrophy). Patient understands to avoid using high potency steroids in skin folds, the groin, and the face.  The patient verbalized understanding of proper use and possible adverse effects of topical steroids.      Use Hydroxyzine 25 mg po TID prn pruritus. If it makes you sleepy, take an oral 2nd grass generation antihistamines (Claritin/Zyrtec/Allegra) during the day and oral 1st class generation anti-histamines (hydroxyzine/benadryl) at night. Antihistamines " are diphenhydramine (Benadryl),  hydroxyzine (Atarax), loratadine (Claritin), cetirizine (Zyrtec), and fexofenadine (Allegra), and levocetirizine (Xyzal).     If symptoms are not improving, add in another anti-histamine agent such as Ranitidine (zantac).  This is used in acid reflux; it helps reduce histamine release.     Systemic glucocorticoids may be added to antihistamine therapy for patients with prominent angioedema or if symptoms persist beyond a few days.   Discussed adverse side effects of recurrent/long term steroid use: elevated blood pressure elevated blood sugar, pancreatitis,glaucoma/cataracts, weight gain in face/abdomen/neck, round face (moon face), fluid retention in legs/lungs, mood swings, upset stomach, increased risk of infections, osteoporosis and increased risk of fractures, fatigue, loss of appetite, nausea and muscle weakness, thin skin, bruising and slower wound healing.    Patients who are suspected of having an allergic etiology causing new-onset urticaria, such as a food or medication allergy, should be referred to an allergy specialist who will evaluated for possible causes and equip the patient with epinephrine for self-injection when indicated.        If you were prescribed a narcotic or muscle relaxer (Flexeril or Robaxin), do not drive or operate heavy equipment or machinery while taking these medications. These medications can make you drowsy. If you are driving, it is recommended to take ibuprofen TID prn pain and take flexeril/robaxin at night.  If not allergic, take Tylenol (Acetaminophen) 650 mg to  1 g every 6 hours as needed as needed for fever/pain and/or Motrin (Ibuprofen) 600 to 800 mg every 6 hours as needed for pain and/or fever      Please remember that you have received care at an urgent care today. Urgent cares are not emergency rooms and are not equipped to handle life threatening emergencies and cannot rule in or out certain medical conditions and you may be  released before all of your medical problems are known or treated. Please arrange follow up with your primary care physician or speciality clinic  within 2-5 days if your signs and symptoms have not resolved or worsen. Patient can call our Referral Hotline at [phone] to make an appointment.    Please return here or go to the Emergency Department for any concerns or worsening of condition.Patient was educated on signs/symptoms that would warrant emergent medical attention. Patient verbalized understanding.  You start to have severe trouble breathing or swallowing (for example, you cannot speak in full sentences).  The rash spreads over large parts of your body and most of your skin becomes red.  It is becoming hard to breathe, but you can still talk in full sentences.  You have a fever of 100.4°F (38°C) or higher or chills.  You have signs of a wound infection like swelling, redness, warmth, pain, or drainage from the wound.

## 2025-02-10 NOTE — LETTER
"  February 10, 2025      [facility] [address]  [place] [address]  Phone: [phone]  Fax: [phone]       Patient: Teresita Wheeler   YOB: 1980  Date of Visit: 02/10/2025    To Whom It May Concern:    Selina Wheeler  was at [facility] on 02/10/2025. The patient may return to work/school on 2/11/25 with no restrictions. If you have any questions or concerns, or if I can be of further assistance, please do not hesitate to contact me.    Sincerely,        Tanjaleia Elias PA-C (Jackie)       "

## 2025-03-12 PROBLEM — Z98.82 HISTORY OF BILATERAL BREAST IMPLANTS: Status: ACTIVE | Noted: 2025-02-26

## 2025-06-19 ENCOUNTER — TELEPHONE (OUTPATIENT)
Dept: RHEUMATOLOGY | Facility: CLINIC | Age: 45
End: 2025-06-19
Payer: COMMERCIAL

## 2025-06-19 NOTE — TELEPHONE ENCOUNTER
We apologize for the inconvenience, but unfortunately the provider will not be in the office for your upcoming appointment. Attempts to reach you by phone were unsuccessful. We have rescheduled your appointment to the next available date and time. Please reach out via the portal or phone if your new appointment date and time does not work and we will get you rescheduled.